# Patient Record
Sex: FEMALE | Race: WHITE | Employment: PART TIME | URBAN - METROPOLITAN AREA
[De-identification: names, ages, dates, MRNs, and addresses within clinical notes are randomized per-mention and may not be internally consistent; named-entity substitution may affect disease eponyms.]

---

## 2020-10-14 ENCOUNTER — APPOINTMENT (OUTPATIENT)
Dept: CT IMAGING | Age: 45
End: 2020-10-14
Payer: COMMERCIAL

## 2020-10-14 ENCOUNTER — HOSPITAL ENCOUNTER (EMERGENCY)
Age: 45
Discharge: HOME | End: 2020-10-14
Attending: EMERGENCY MEDICINE
Payer: COMMERCIAL

## 2020-10-14 VITALS
HEART RATE: 95 BPM | WEIGHT: 260 LBS | DIASTOLIC BLOOD PRESSURE: 100 MMHG | SYSTOLIC BLOOD PRESSURE: 186 MMHG | RESPIRATION RATE: 16 BRPM | OXYGEN SATURATION: 97 % | BODY MASS INDEX: 44.39 KG/M2 | HEIGHT: 64 IN | TEMPERATURE: 98.6 F

## 2020-10-14 LAB
A/G RATIO: 1.8 (ref 1.1–2.2)
ALBUMIN SERPL-MCNC: 4.3 G/DL (ref 3.4–5)
ALP BLD-CCNC: 114 U/L (ref 40–129)
ALT SERPL-CCNC: 26 U/L (ref 10–40)
ANION GAP SERPL CALCULATED.3IONS-SCNC: 12 MMOL/L (ref 3–16)
AST SERPL-CCNC: 19 U/L (ref 15–37)
ATYPICAL LYMPHOCYTE RELATIVE PERCENT: 4 % (ref 0–6)
BACTERIA: ABNORMAL /HPF
BASOPHILS ABSOLUTE: 0 K/UL (ref 0–0.2)
BASOPHILS RELATIVE PERCENT: 0 %
BILIRUB SERPL-MCNC: 0.6 MG/DL (ref 0–1)
BILIRUBIN URINE: NEGATIVE
BLOOD, URINE: ABNORMAL
BUN BLDV-MCNC: 7 MG/DL (ref 7–20)
CALCIUM SERPL-MCNC: 10.6 MG/DL (ref 8.3–10.6)
CHLORIDE BLD-SCNC: 102 MMOL/L (ref 99–110)
CLARITY: CLEAR
CO2: 24 MMOL/L (ref 21–32)
COLOR: YELLOW
CREAT SERPL-MCNC: 0.6 MG/DL (ref 0.6–1.1)
EKG ATRIAL RATE: 93 BPM
EKG DIAGNOSIS: NORMAL
EKG P AXIS: 43 DEGREES
EKG P-R INTERVAL: 146 MS
EKG Q-T INTERVAL: 376 MS
EKG QRS DURATION: 86 MS
EKG QTC CALCULATION (BAZETT): 467 MS
EKG R AXIS: 38 DEGREES
EKG T AXIS: 20 DEGREES
EKG VENTRICULAR RATE: 93 BPM
EOSINOPHILS ABSOLUTE: 0.2 K/UL (ref 0–0.6)
EOSINOPHILS RELATIVE PERCENT: 3 %
EPITHELIAL CELLS, UA: ABNORMAL /HPF (ref 0–5)
GFR AFRICAN AMERICAN: >60
GFR NON-AFRICAN AMERICAN: >60
GLOBULIN: 2.4 G/DL
GLUCOSE BLD-MCNC: 143 MG/DL (ref 70–99)
GLUCOSE URINE: NEGATIVE MG/DL
HCT VFR BLD CALC: 44.5 % (ref 36–48)
HEMOGLOBIN: 15.1 G/DL (ref 12–16)
KETONES, URINE: NEGATIVE MG/DL
LEUKOCYTE ESTERASE, URINE: NEGATIVE
LIPASE: 17 U/L (ref 13–60)
LYMPHOCYTES ABSOLUTE: 1.5 K/UL (ref 1–5.1)
LYMPHOCYTES RELATIVE PERCENT: 14 %
MCH RBC QN AUTO: 27.5 PG (ref 26–34)
MCHC RBC AUTO-ENTMCNC: 33.9 G/DL (ref 31–36)
MCV RBC AUTO: 81.2 FL (ref 80–100)
MICROSCOPIC EXAMINATION: YES
MONOCYTES ABSOLUTE: 0.4 K/UL (ref 0–1.3)
MONOCYTES RELATIVE PERCENT: 5 %
NEUTROPHILS ABSOLUTE: 6.1 K/UL (ref 1.7–7.7)
NEUTROPHILS RELATIVE PERCENT: 74 %
NITRITE, URINE: POSITIVE
PDW BLD-RTO: 14.8 % (ref 12.4–15.4)
PH UA: 7 (ref 5–8)
PLATELET # BLD: 168 K/UL (ref 135–450)
PLATELET SLIDE REVIEW: ADEQUATE
PMV BLD AUTO: 7.9 FL (ref 5–10.5)
POTASSIUM REFLEX MAGNESIUM: 3.6 MMOL/L (ref 3.5–5.1)
PROTEIN UA: NEGATIVE MG/DL
RBC # BLD: 5.49 M/UL (ref 4–5.2)
RBC UA: ABNORMAL /HPF (ref 0–4)
SLIDE REVIEW: ABNORMAL
SODIUM BLD-SCNC: 138 MMOL/L (ref 136–145)
SPECIFIC GRAVITY UA: 1.02 (ref 1–1.03)
TOTAL PROTEIN: 6.7 G/DL (ref 6.4–8.2)
TROPONIN: <0.01 NG/ML
URINE REFLEX TO CULTURE: ABNORMAL
URINE TYPE: ABNORMAL
UROBILINOGEN, URINE: 0.2 E.U./DL
WBC # BLD: 8.3 K/UL (ref 4–11)
WBC UA: ABNORMAL /HPF (ref 0–5)

## 2020-10-14 PROCEDURE — 87088 URINE BACTERIA CULTURE: CPT

## 2020-10-14 PROCEDURE — 6370000000 HC RX 637 (ALT 250 FOR IP): Performed by: EMERGENCY MEDICINE

## 2020-10-14 PROCEDURE — 87077 CULTURE AEROBIC IDENTIFY: CPT

## 2020-10-14 PROCEDURE — 93010 ELECTROCARDIOGRAM REPORT: CPT | Performed by: INTERNAL MEDICINE

## 2020-10-14 PROCEDURE — 2580000003 HC RX 258: Performed by: EMERGENCY MEDICINE

## 2020-10-14 PROCEDURE — 99284 EMERGENCY DEPT VISIT MOD MDM: CPT

## 2020-10-14 PROCEDURE — 93005 ELECTROCARDIOGRAM TRACING: CPT | Performed by: EMERGENCY MEDICINE

## 2020-10-14 PROCEDURE — 6360000002 HC RX W HCPCS: Performed by: EMERGENCY MEDICINE

## 2020-10-14 PROCEDURE — 96374 THER/PROPH/DIAG INJ IV PUSH: CPT

## 2020-10-14 PROCEDURE — 83690 ASSAY OF LIPASE: CPT

## 2020-10-14 PROCEDURE — 74176 CT ABD & PELVIS W/O CONTRAST: CPT

## 2020-10-14 PROCEDURE — 87186 SC STD MICRODIL/AGAR DIL: CPT

## 2020-10-14 PROCEDURE — 87086 URINE CULTURE/COLONY COUNT: CPT

## 2020-10-14 PROCEDURE — 85025 COMPLETE CBC W/AUTO DIFF WBC: CPT

## 2020-10-14 PROCEDURE — 80053 COMPREHEN METABOLIC PANEL: CPT

## 2020-10-14 PROCEDURE — 81001 URINALYSIS AUTO W/SCOPE: CPT

## 2020-10-14 PROCEDURE — 2500000003 HC RX 250 WO HCPCS: Performed by: EMERGENCY MEDICINE

## 2020-10-14 PROCEDURE — 96375 TX/PRO/DX INJ NEW DRUG ADDON: CPT

## 2020-10-14 PROCEDURE — 84484 ASSAY OF TROPONIN QUANT: CPT

## 2020-10-14 RX ORDER — HYDROCHLOROTHIAZIDE 12.5 MG/1
12.5 CAPSULE, GELATIN COATED ORAL DAILY
Qty: 30 CAPSULE | Refills: 1 | Status: SHIPPED | OUTPATIENT
Start: 2020-10-14

## 2020-10-14 RX ORDER — 0.9 % SODIUM CHLORIDE 0.9 %
1000 INTRAVENOUS SOLUTION INTRAVENOUS ONCE
Status: COMPLETED | OUTPATIENT
Start: 2020-10-14 | End: 2020-10-14

## 2020-10-14 RX ORDER — ONDANSETRON 2 MG/ML
4 INJECTION INTRAMUSCULAR; INTRAVENOUS ONCE
Status: COMPLETED | OUTPATIENT
Start: 2020-10-14 | End: 2020-10-14

## 2020-10-14 RX ORDER — ONDANSETRON 4 MG/1
4 TABLET, ORALLY DISINTEGRATING ORAL EVERY 8 HOURS PRN
Qty: 10 TABLET | Refills: 0 | Status: SHIPPED | OUTPATIENT
Start: 2020-10-14 | End: 2020-10-26 | Stop reason: ALTCHOICE

## 2020-10-14 RX ORDER — ACETAMINOPHEN 500 MG
1000 TABLET ORAL ONCE
Status: COMPLETED | OUTPATIENT
Start: 2020-10-14 | End: 2020-10-14

## 2020-10-14 RX ORDER — HYDROCODONE BITARTRATE AND ACETAMINOPHEN 5; 325 MG/1; MG/1
1 TABLET ORAL EVERY 6 HOURS PRN
Qty: 6 TABLET | Refills: 0 | Status: SHIPPED | OUTPATIENT
Start: 2020-10-14 | End: 2020-10-17

## 2020-10-14 RX ORDER — NITROFURANTOIN 25; 75 MG/1; MG/1
100 CAPSULE ORAL 2 TIMES DAILY
Qty: 14 CAPSULE | Refills: 0 | Status: SHIPPED | OUTPATIENT
Start: 2020-10-14 | End: 2020-10-21

## 2020-10-14 RX ORDER — NITROFURANTOIN 25; 75 MG/1; MG/1
100 CAPSULE ORAL ONCE
Status: COMPLETED | OUTPATIENT
Start: 2020-10-14 | End: 2020-10-14

## 2020-10-14 RX ADMIN — SODIUM CHLORIDE 1000 ML: 9 INJECTION, SOLUTION INTRAVENOUS at 04:33

## 2020-10-14 RX ADMIN — FAMOTIDINE 20 MG: 10 INJECTION INTRAVENOUS at 04:33

## 2020-10-14 RX ADMIN — LIDOCAINE HYDROCHLORIDE: 20 SOLUTION ORAL; TOPICAL at 04:32

## 2020-10-14 RX ADMIN — ONDANSETRON HYDROCHLORIDE 4 MG: 2 INJECTION, SOLUTION INTRAMUSCULAR; INTRAVENOUS at 04:33

## 2020-10-14 RX ADMIN — ACETAMINOPHEN 1000 MG: 500 TABLET ORAL at 06:44

## 2020-10-14 RX ADMIN — NITROFURANTOIN (MONOHYDRATE/MACROCRYSTALS) 100 MG: 75; 25 CAPSULE ORAL at 06:44

## 2020-10-14 ASSESSMENT — PAIN SCALES - GENERAL
PAINLEVEL_OUTOF10: 7
PAINLEVEL_OUTOF10: 10

## 2020-10-14 ASSESSMENT — PAIN DESCRIPTION - ORIENTATION: ORIENTATION: MID;UPPER

## 2020-10-14 ASSESSMENT — PAIN DESCRIPTION - LOCATION: LOCATION: ABDOMEN

## 2020-10-14 ASSESSMENT — PAIN DESCRIPTION - DESCRIPTORS: DESCRIPTORS: BURNING;TIGHTNESS

## 2020-10-14 ASSESSMENT — PAIN SCALES - WONG BAKER: WONGBAKER_NUMERICALRESPONSE: 0

## 2020-10-14 ASSESSMENT — PAIN DESCRIPTION - PAIN TYPE: TYPE: ACUTE PAIN

## 2020-10-14 NOTE — ED PROVIDER NOTES
CHIEF COMPLAINT  Abdominal Pain (mid upper abdominal pain x2 months that radiates into her back. Patient states it is worse tonight. Patient also reports having diarrhea x2 months as well. )      HISTORY OF PRESENT ILLNESS  Halley Salazar is a 39 y.o. female presents to the ED with abdominal pain, epigastric and RUQ radiating around back and shoulder blade, worsened tonight, could not sleep, has been off and on for a couple months, pretty much every day, she did eat a fatty meal tonight, but had not noticed which foods bothered her most, nausea and sometimes vomiting, nonbloody/nonbilious, watery diarrhea, usually right after eating, she states she is supposed to be on blood pressure meds, but has not been on her HCTZ, hasn't been to a PCP in over a year or more, no melena/hematochezia, no chest pain/SOB, c section and total hysterectomy but no other abd surgeries. Denies dysuria/hematuria, no vaginal bleeding/discharge. She is a smoker, tried tylenol for pain w/o much improvement, has been taking ibuprofen fairly regularly, which seems to help, tried tums/otc antacids w/o improvement, has had issues w/ reflux before and has medication she could be taking regularly- prevacid, symptoms worse at night, denies any known liver problems/hepatitis, no yellowing of skin/eyes, no fever, denies significant etoh use, denies drug use, No other complaints, modifying factors or associated symptoms. I have reviewed the following from the nursing documentation.     Past Medical History:   Diagnosis Date    Anemia     BORDERLINE--NO MEDS    Anxiety     Fibroid     Hormone disorder     Hypertension     USES HCTZ 12.5    Irregular uterine bleeding     resolved s/p hyst     Past Surgical History:   Procedure Laterality Date     SECTION   & 2003    HYST[facility]CTOMY  2010    [facility]     Family History   Problem Relation Age of Onset    Hypertension Mother     Stroke Mother     Cancer Sister         RECTAL    High Cholesterol Brother      Social History     Socioeconomic History    Marital status:      Spouse name: Not on file    Number of children: Not on file    Years of education: Not on file    Highest education level: Not on file   Occupational History    Not on file   Social Needs    Financial resource strain: Not on file    Food insecurity     Worry: Not on file     Inability: Not on file    Transportation needs     Medical: Not on file     Non-medical: Not on file   Tobacco Use    Smoking status: Current Every Day Smoker     Packs/day: 1.00     Years: 18.00     Pack years: 18.00     Types: Cigarettes   Substance and Sexual Activity    Alcohol use: No    Drug use: No    Sexual activity: Not on file   Lifestyle    Physical activity     Days per week: Not on file     Minutes per session: Not on file    Stress: Not on file   Relationships    Social connections     Talks on phone: Not on file     Gets together: Not on file     Attends Druze service: Not on file     Active member of club or organization: Not on file     Attends meetings of clubs or organizations: Not on file     Relationship status: Not on file    Intimate partner violence     Fear of current or ex partner: Not on file     Emotionally abused: Not on file     Physically abused: Not on file     Forced sexual activity: Not on file   Other Topics Concern    Not on file   Social History Narrative    Not on file     No current facility-administered medications for this encounter. Current Outpatient Medications   Medication Sig Dispense Refill    ondansetron (ZOFRAN ODT) 4 MG disintegrating tablet Take 1 tablet by mouth every 8 hours as needed for Nausea or Vomiting 10 tablet 0    HYDROcodone-acetaminophen (NORCO) 5-325 MG per tablet Take 1 tablet by mouth every 6 hours as needed for Pain for up to 3 days. Intended supply: 3 days.  Take lowest dose possible to manage pain 6 tablet 0    nitrofurantoin, macrocrystal-monohydrate, (MACROBID) 100 MG capsule Take 1 capsule by mouth 2 times daily for 7 days 14 capsule 0    hydroCHLOROthiazide (MICROZIDE) 12.5 MG capsule Take 1 capsule by mouth daily 30 capsule 1    erythromycin (YUNIOR-TAB) 333 MG EC tablet Take 333 mg by mouth 3 times daily.  clonazepam (KLONOPIN) 0.5 MG tablet Take 0.5 mg by mouth nightly as needed. Allergies   Allergen Reactions    Penicillins        REVIEW OF SYSTEMS  10 systems reviewed, pertinent positives per HPI otherwise noted to be negative. PHYSICAL EXAM  BP (!) 186/100   Pulse 95   Temp 98.6 °F (37 °C) (Oral)   Resp 16   Ht 5' 4\" (1.626 m)   Wt 260 lb (117.9 kg)   LMP 09/13/2009   SpO2 97%   BMI 44.63 kg/m²   GENERAL APPEARANCE: Awake and alert. Cooperative. No acute distress, appears older than stated age, obese  HEAD: Normocephalic. Atraumatic. EYES: PERRL. EOM's grossly intact. ENT: Mucous membranes are moist. Airway patent, no stridor/erythema/edema  NECK: Supple. No rigidity  HEART: RRR. No murmurs  LUNGS: Respirations nonlabored. Lungs are CTAB. ABDOMEN: Soft. Non-distended. RUQ/R flank TTP, pos Bro's sign, neg mcburney's pt tenderness, neg rovsing sign, No guarding or rebound. Normal bowel sounds. Rotund, low transverse c section w/ scarring, no erythema/edema/purulence  EXTREMITIES: No peripheral edema. No calf tenderness, Moves all extremities equally. All extremities neurovascularly intact. SKIN: Warm and dry. No acute rashes. NEUROLOGICAL: Alert and oriented. No gross facial drooping. Speech normal  PSYCHIATRIC: Normal mood and affect. LABS  I have reviewed all labs for this visit.    Results for orders placed or performed during the hospital encounter of 10/14/20   CBC Auto Differential   Result Value Ref Range    WBC 8.3 4.0 - 11.0 K/uL    RBC 5.49 (H) 4.00 - 5.20 M/uL    Hemoglobin 15.1 12.0 - 16.0 g/dL    Hematocrit 44.5 36.0 - 48.0 %    MCV 81.2 80.0 - 100.0 fL    MCH 27.5 26.0 - 34.0 pg    MCHC 33.9 31.0 - 36.0 ng/mL   Microscopic Urinalysis   Result Value Ref Range    WBC, UA 3-5 0 - 5 /HPF    RBC, UA 0-2 0 - 4 /HPF    Epithelial Cells, UA 2-5 0 - 5 /HPF    Bacteria, UA 4+ (A) None Seen /HPF   EKG 12 Lead   Result Value Ref Range    Ventricular Rate 93 BPM    Atrial Rate 93 BPM    P-R Interval 146 ms    QRS Duration 86 ms    Q-T Interval 376 ms    QTc Calculation (Bazett) 467 ms    P Axis 43 degrees    R Axis 38 degrees    T Axis 20 degrees    Diagnosis       Normal sinus rhythmNormal ECGNo previous ECGs availableConfirmed by GILLES NAVA MD ([id number]) on 10/14/2020 7:34:20 AM       RADIOLOGY  Ct Abdomen Pelvis Wo Contrast Additional Contrast? None    Result Date: 10/14/2020  EXAMINATION: CT OF THE ABDOMEN AND PELVIS WITHOUT CONTRAST 10/14/2020 4:49 am TECHNIQUE: CT of the abdomen and pelvis was performed without the administration of intravenous contrast. Multiplanar reformatted images are provided for review. Dose modulation, iterative reconstruction, and/or weight based adjustment of the mA/kV was utilized to reduce the radiation dose to as low as reasonably achievable. COMPARISON: None. HISTORY: ORDERING SYSTEM PROVIDED HISTORY: epigastric RUQ R flank pain TECHNOLOGIST PROVIDED HISTORY: Reason for exam:->epigastric RUQ R flank pain Additional Contrast?->None Is the patient pregnant?->No Reason for Exam: epigastric RUQ R flank pain Acuity: Acute Type of Exam: Initial FINDINGS: Lower Chest: The visualized lung bases are clear. Organs: There are no renal or ureteral calculi bilaterally and there is no hydronephrosis. Faint cortical calcification is seen on the right. Cholelithiasis is noted without pericholecystic inflammation. The liver, pancreas, and adrenal glands are grossly negative given the limitations of the noncontrast technique. Splenic size is at the upper limits of normal. GI/Bowel: Small bowel caliber is normal.  The appendix is normal.  The colon is unremarkable. Pelvis:  The patient is status post hysterectomy. The bladder is grossly negative though is nearly completely empty limiting evaluation Peritoneum/Retroperitoneum: Small periaortic and iliac chain lymph nodes are probably reactive. There is no mesenteric stranding or free fluid. Aortic caliber is normal. Bones/Soft Tissues: There is a 2.2 x 4.4 cm focal soft tissue thickening of the inferior aspect of the rectus sheath. There is no acute osseous abnormality. 1. No evidence for renal stone disease. 2. Cholelithiasis without scan evidence for acute cholecystitis. 3. Soft tissue thickening of the inferior rectus sheath is nonspecific. This may represent scar tissue though tumor is not entirely excluded. Comparison with any prior outside imaging would be beneficial.     ED COURSE/MDM  Patient seen and evaluated. Old records reviewed. Labs and imaging reviewed and results discussed with patient.   46yo F w/ abd pain, I am suspicious for gallbladder disease given symptoms, but nml LFTs/lipase, nml WBC, low suspicion for infectious process at this time, she opted for CT scan rather than awaiting US tech to be called in, but did accept outpatient US order and I recommended gen surgery follow up, gallstones noted on imaging, told to follow low fat diet, she was also noted to have a UTI incidentally, started macrobid, no current suspicion for sepsis/pyelo, given age and risk factors checked trop and EKG but both were normal and she denied chest pain, discussed her risk factors for heart disease/stroke, encouraged smoking cessation, resumed her HCTZ and given PCP follow up, explained she may need med increased or additional meds for HTN, he reports it has been this high for a while, given meds for symptom management for home, GI cocktail and pepcid didn't help pain much, but zofran improved nausea, encouraged resuming her daily PPI for reflux, especially if she will be taking NSAIDs, we also discussed the incidental finding of likely scar tissue from prior surgery, she was aware, Strict return precautions given, all questions answered, will return if any worsening symptoms or new concerns, see AVS for further discharge information, patient verbalized understanding of plan, felt comfortable going home. I estimate there is LOW risk for ACUTE APPENDICITIS, BOWEL OBSTRUCTION, ACUTE CHOLECYSTITIS/ASCENDING CHOLANGITIS, DIVERTICULITIS, INCARCERATED HERNIA, PANCREATITIS, or PERFORATED BOWEL or ULCER, thus I consider the discharge disposition reasonable. Also, there is no evidence or peritonitis, sepsis, or toxicity. Birdie Juarez and I have discussed the diagnosis and risks, and we agree with discharging home to follow-up with their primary doctor. We also discussed returning to the Emergency Department immediately if new or worsening symptoms occur. We have discussed the symptoms which are most concerning (e.g., bloody stool, fever, changing or worsening pain, vomiting) that necessitate immediate return.    Orders Placed This Encounter   Procedures    Culture, Urine    CT ABDOMEN PELVIS WO CONTRAST Additional Contrast? None    US GALLBLADDER RUQ    CBC Auto Differential    Comprehensive Metabolic Panel w/ Reflex to MG    Lipase    Urinalysis Reflex to Culture    Troponin    Microscopic Urinalysis    Referral for No Primary Care Physician - Urgent    EKG 12 Lead    Insert peripheral IV     Orders Placed This Encounter   Medications    0.9 % sodium chloride bolus    aluminum & magnesium hydroxide-simethicone (MAALOX) 30 mL, lidocaine viscous hcl (XYLOCAINE) 5 mL (GI COCKTAIL)    ondansetron (ZOFRAN) injection 4 mg    famotidine (PEPCID) injection 20 mg    nitrofurantoin (macrocrystal-monohydrate) (MACROBID) capsule 100 mg    acetaminophen (TYLENOL) tablet 1,000 mg    ondansetron (ZOFRAN ODT) 4 MG disintegrating tablet     Sig: Take 1 tablet by mouth every 8 hours as needed for Nausea or Vomiting     Dispense:  10 tablet     Refill:  0    HYDROcodone-acetaminophen (NORCO) 5-325 MG per tablet     Sig: Take 1 tablet by mouth every 6 hours as needed for Pain for up to 3 days. Intended supply: 3 days. Take lowest dose possible to manage pain     Dispense:  6 tablet     Refill:  0    nitrofurantoin, macrocrystal-monohydrate, (MACROBID) 100 MG capsule     Sig: Take 1 capsule by mouth 2 times daily for 7 days     Dispense:  14 capsule     Refill:  0    hydroCHLOROthiazide (MICROZIDE) 12.5 MG capsule     Sig: Take 1 capsule by mouth daily     Dispense:  30 capsule     Refill:  1     ED Course as of Oct 14 0811   Wed Oct 14, 2020   3810 EKG interpretation by me: Rhythm, rate 93, QTc 467, normal axis, no ST segment or T wave changes indicative of acute ischemia   EKG 12 Lead [SY]      ED Course User Index  [SY] Jorge Carmona DO       CLINICAL IMPRESSION  1. Uncontrolled hypertension    2. Abdominal pain, epigastric    3. Abdominal pain, right upper quadrant    4. Gallstones    5. Nausea    6. Diarrhea, unspecified type    7. Gastroesophageal reflux disease, unspecified whether esophagitis present    8. Smoker        Blood pressure (!) 186/100, pulse 95, temperature 98.6 °F (37 °C), temperature source Oral, resp. rate 16, height 5' 4\" (1.626 m), weight 260 lb (117.9 kg), last menstrual period 09/13/2009, SpO2 97 %. DISPOSITION  Jo Bernheim was discharged to home in stable condition.                    Jorge Carmona DO  10/20/20 1050

## 2020-10-14 NOTE — DISCHARGE INSTR - COC
Continuity of Care Form    Patient Name: Taqueria Hanks   :  1975  MRN:  [id number]    Admit date:  10/14/2020  Discharge date:  ***    Code Status Order: No Order   Advance Directives:     Admitting Physician:  No admitting provider for patient encounter. PCP: Ambar Vlilavicencio (Inactive)    Discharging Nurse: [facility] Unit/Room#:   Discharging Unit Phone Number: ***    Emergency Contact:   Extended Emergency Contact Information  Primary Emergency Contact: Jon Juarez  Address: [facility] [address] [facility] [address], [address] Phone: [phone]  Work Phone: [phone]  Relation: Spouse    Past Surgical History:  Past Surgical History:   Procedure Laterality Date     SECTION   &     HYST[facility]Y  2010    [facility]       Immunization History: There is no immunization history on file for this patient.     Active Problems:  Patient Active Problem List   Diagnosis Code    Anemia D64.9    Anxiety F41.9    Hypertension I10    Bartholin's Gland Abscess--right N75.1       Isolation/Infection:   Isolation          No Isolation        Patient Infection Status     None to display          Nurse Assessment:  Last Vital Signs: BP (!) 172/86   Pulse 95   Temp 98.6 °F (37 °C) (Oral)   Resp 16   Ht 5' 4\" (1.626 m)   Wt 260 lb (117.9 kg)   LMP 2009   SpO2 100%   BMI 44.63 kg/m²     Last documented pain score (0-10 scale): Pain Level: 10  Last Weight:   Wt Readings from Last 1 Encounters:   10/14/20 260 lb (117.9 kg)     Mental Status:  {IP PT MENTAL STATUS:}    IV Access:  { MINA IV ACCESS:224957827}    Nursing Mobility/ADLs:  Walking   {CHP DME MFVP:114166476}  Transfer  {CHP DME EYMJ:866990546}  Bathing  {CHP DME XRG}  Dressing  {CHP DME PCTW:329753295}  Toileting  {CHP DME MJFB:507080228}  Feeding  {CHP DME BUBB:513032326}  Med Admin  {P DME WSQA:166046034}  Med Delivery   { MINA MED Delivery:407753548}    Wound Care Documentation and Therapy:        Elimination:  Continence:   · Bowel: {YES / JL:62126}  · Bladder: {YES / ZP:11057}  Urinary Catheter: {Urinary Catheter:968658251}   Colostomy/Ileostomy/Ileal Conduit: {YES / RO:34592}       Date of Last BM: ***  No intake or output data in the 24 hours ending 10/14/20 0605  No intake/output data recorded.     Safety Concerns:     [address] [facility] Safety Concerns:587131361}    Impairments/Disabilities:      [address] [facility] Impairments/Disabilities:890857657}    Nutrition Therapy:  Current Nutrition Therapy:   [address] [facility] Diet List:442903457}    Routes of Feeding: {CHP DME Other Feedings:567837675}  Liquids: {Slp liquid thickness:69906}  Daily Fluid Restriction: {CHP DME Yes amt example:335959446}  Last Modified Barium Swallow with Video (Video Swallowing Test): {Done Not Done AUKH:997291558}    Treatments at the Time of Hospital Discharge:   Respiratory Treatments: ***  Oxygen Therapy:  {Therapy; copd oxygen:80284}  Ventilator:    { CC Vent TZYD:384381492}    Rehab Therapies: {THERAPEUTIC INTERVENTION:4724503668}  Weight Bearing Status/Restrictions: [address]  Weight Bearin}  Other Medical Equipment (for information only, NOT a DME order):  {EQUIPMENT:791199474}  Other Treatments: ***    Patient's personal belongings (please select all that are sent with patient):  {[facility] DME Belongings:279916883}    RN SIGNATURE:  {Esignature:912339349}    CASE MANAGEMENT/SOCIAL WORK SECTION    Inpatient Status Date: ***    Readmission Risk Assessment Score:  Readmission Risk              Risk of Unplanned Readmission:        0           Discharging to Facility/ Agency   · Name:   · Address:  · Phone:  · Fax:    Dialysis Facility (if applicable)   · Name:  · Address:  · Dialysis Schedule:  · Phone:  · Fax:    / signature: {Esignature:914059037}    PHYSICIAN SECTION    Prognosis: {Prognosis:4326421718}    Condition at Discharge: [address] Patient Condition:755375435}    Rehab Potential (if transferring to Rehab): {Prognosis:0066695473}    Recommended Labs or Other Treatments After Discharge: ***    Physician Certification: I certify the above information and transfer of Vinay Frank  is necessary for the continuing treatment of the diagnosis listed and that she requires {Admit to Appropriate Level of Care:31479} for {GREATER/LESS:213411755} 30 days.      Update Admission H&P: {CHP DME Changes in [facility]:343172211}    PHYSICIAN SIGNATURE:  {Esignature:437865606}

## 2020-10-16 ENCOUNTER — HOSPITAL ENCOUNTER (OUTPATIENT)
Dept: ULTRASOUND IMAGING | Age: 45
Discharge: HOME | End: 2020-10-16
Payer: COMMERCIAL

## 2020-10-16 LAB
ORGANISM: ABNORMAL
URINE CULTURE, ROUTINE: ABNORMAL

## 2020-10-16 PROCEDURE — 76705 ECHO EXAM OF ABDOMEN: CPT

## 2020-10-16 NOTE — RESULT ENCOUNTER NOTE
Culture reviewed, culture is positive, patient was discharged on an appropriate antibiotic. Please inform patient of positive culture but the correct antibiotic and to have repeat urinalysis in 1 week with PCP to ensure UTI resolves since she did complain of back pain and sometimes this will not treat the infection if it is related to pyelonephritis, but based on the limited note I currently have, it sounds that the back pain is more of a chronic problem and not related to the UTI.

## 2020-10-20 ENCOUNTER — INITIAL CONSULT (OUTPATIENT)
Dept: SURGERY | Age: 45
End: 2020-10-20
Payer: COMMERCIAL

## 2020-10-20 VITALS
HEIGHT: 64 IN | WEIGHT: 280 LBS | BODY MASS INDEX: 47.8 KG/M2 | SYSTOLIC BLOOD PRESSURE: 128 MMHG | TEMPERATURE: 97.8 F | DIASTOLIC BLOOD PRESSURE: 78 MMHG

## 2020-10-20 PROCEDURE — 99243 OFF/OP CNSLTJ NEW/EST LOW 30: CPT | Performed by: SURGERY

## 2020-10-20 RX ORDER — HEPARIN SODIUM 5000 [USP'U]/ML
5000 INJECTION, SOLUTION INTRAVENOUS; SUBCUTANEOUS ONCE
Status: CANCELLED | OUTPATIENT
Start: 2020-10-20

## 2020-10-20 RX ORDER — INDOCYANINE GREEN AND WATER 25 MG
5 KIT INJECTION ONCE
Status: CANCELLED | OUTPATIENT
Start: 2020-10-20 | End: 2020-10-20

## 2020-10-20 RX ORDER — SODIUM CHLORIDE 0.9 % (FLUSH) 0.9 %
10 SYRINGE (ML) INJECTION EVERY 12 HOURS SCHEDULED
Status: CANCELLED | OUTPATIENT
Start: 2020-10-20

## 2020-10-20 RX ORDER — SODIUM CHLORIDE 0.9 % (FLUSH) 0.9 %
10 SYRINGE (ML) INJECTION PRN
Status: CANCELLED | OUTPATIENT
Start: 2020-10-20

## 2020-10-20 NOTE — PROGRESS NOTES
New Patient Via Jose Rafael Pena MD    [address], [address] [facility]  [facility]  [phone]    [facility]   YOB: 1975    Date of Visit:  10/20/2020    [facility]      Chief Complaint: Abdominal pain    HPI: Patient presents for evaluation of abdominal pain. She has had multiple episodes of epigastric and right upper quadrant pain that radiates around her back. She has had associated nausea and occasional vomiting. She denies fever or chills. The pain is a crampy pain that she states feels similar to labor.   She has been watching the fat in her diet recently, and this seems to improve her pain    Allergies   Allergen Reactions    Penicillins      Outpatient Medications Marked as Taking for the 10/20/20 encounter (Initial consult) with Girma Bell MD   Medication Sig Dispense Refill    nitrofurantoin, macrocrystal-monohydrate, (MACROBID) 100 MG capsule Take 1 capsule by mouth 2 times daily for 7 days 14 capsule 0    hydroCHLOROthiazide (MICROZIDE) 12.5 MG capsule Take 1 capsule by mouth daily 30 capsule 1       Past Medical History:   Diagnosis Date    Anemia     BORDERLINE--NO MEDS    Anxiety     Fibroid     Hormone disorder     Hypertension     USES HCTZ 12.5    Irregular uterine bleeding     resolved s/p hyst     Past Surgical History:   Procedure Laterality Date     SECTION   &     HY[facility]Y  2010    [facility]     Family History   Problem Relation Age of Onset    Hypertension Mother     Stroke Mother     Cancer Sister         RECTAL    High Cholesterol Brother      Social History     Socioeconomic History    Marital status:      Spouse name: Not on file    Number of children: Not on file    Years of education: Not on file    Highest education level: Not on file   Occupational History    Not on file   Social Needs    Financial resource strain: Not on file   [place] insecurity     Worry: Not on file     Inability: Not on file    Transportation needs     Medical: Not on file     Non-medical: Not on file   Tobacco Use    Smoking status: Current Every Day Smoker     Packs/day: 1.00     Years: 18.00     Pack years: 18.00     Types: Cigarettes    Smokeless tobacco: Never Used   Substance and Sexual Activity    Alcohol use: No    Drug use: No    Sexual activity: Not on file   Lifestyle    Physical activity     Days per week: Not on file     Minutes per session: Not on file    Stress: Not on file   Relationships    Social connections     Talks on phone: Not on file     Gets together: Not on file     Attends Gnosticism service: Not on file     Active member of club or organization: Not on file     Attends meetings of clubs or organizations: Not on file     Relationship status: Not on file    Intimate partner violence     Fear of current or ex partner: Not on file     Emotionally abused: Not on file     Physically abused: Not on file     Forced sexual activity: Not on file   Other Topics Concern    Not on file   Social History Narrative    Not on file          Vitals:    10/20/20 0939   BP: 128/78   Site: Left Upper Arm   Position: Sitting   Cuff Size: Large Adult   Temp: 97.8 °F (36.6 °C)   TempSrc: Temporal   Weight: 280 lb (127 kg)   Height: 5' 4\" (1.626 m)     Body mass index is 48.06 kg/m².      Wt Readings from Last 3 Encounters:   10/20/20 280 lb (127 kg)   10/14/20 260 lb (117.9 kg)   03/10/10 263 lb (119.3 kg)     BP Readings from Last 3 Encounters:   10/20/20 128/78   10/14/20 (!) 186/100   03/10/10 132/84        ROS:  As per HPI, otherwise reviewed ×10 systems and negative    PE:  Constitutional:  Well developed, well nourished, no acute distress, non-toxic appearance   Eyes:  PERRL, conjunctiva normal   HENT:  Atraumatic, external ears normal, nose normal. Neck- normal range of motion, no tenderness, supple   Respiratory:  No respiratory distress, normal breath sounds, no rales, no wheezing   Cardiovascular:  Normal rate, normal rhythm  GI: Bowel sounds positive, soft, nontender  :  No costovertebral angle tenderness   Integument:  Well hydrated, no rash   Lymphatic:  No lymphadenopathy noted   Neurologic:  Alert & oriented x 3, no focal deficits noted   Psychiatric:  Speech and behavior appropriate       DATA:  CBC with Differential:    Lab Results   Component Value Date    WBC 8.3 10/14/2020    RBC 5.49 10/14/2020    HGB 15.1 10/14/2020    HCT 44.5 10/14/2020     10/14/2020    MCV 81.2 10/14/2020    MCH 27.5 10/14/2020    MCHC 33.9 10/14/2020    RDW 14.8 10/14/2020    SEGSPCT 77.7 01/26/2010    LYMPHOPCT 14.0 10/14/2020    MONOPCT 5.0 10/14/2020    EOSPCT 0.2 01/26/2010    BASOPCT 0.0 10/14/2020    MONOSABS 0.4 10/14/2020    LYMPHSABS 1.5 10/14/2020    EOSABS 0.2 10/14/2020    BASOSABS 0.0 10/14/2020    DIFFTYPE Auto 01/26/2010     CMP:    Lab Results   Component Value Date     10/14/2020    K 3.6 10/14/2020     10/14/2020    CO2 24 10/14/2020    BUN 7 10/14/2020    CREATININE 0.6 10/14/2020    GFRAA >60 10/14/2020    AGRATIO 1.8 10/14/2020    LABGLOM >60 10/14/2020    GLUCOSE 143 10/14/2020    PROT 6.7 10/14/2020    LABALBU 4.3 10/14/2020    CALCIUM 10.6 10/14/2020    BILITOT 0.6 10/14/2020    ALKPHOS 114 10/14/2020    AST 19 10/14/2020    ALT 26 10/14/2020     Radiology Review: CT and ultrasound images reviewed and show cholelithiasis without secondary signs of cholecystitis      Assessment:  1. Gallstones        Plan: Robotic cholecystectomy. I explained the procedure including risks, benefits, and alternatives. Questions were answered and the patient agrees to proceed.

## 2020-10-26 ENCOUNTER — HOSPITAL ENCOUNTER (OUTPATIENT)
Age: 45
Discharge: HOME | End: 2020-10-26
Payer: COMMERCIAL

## 2020-10-26 PROCEDURE — U0003 INFECTIOUS AGENT DETECTION BY NUCLEIC ACID (DNA OR RNA); SEVERE ACUTE RESPIRATORY SYNDROME CORONAVIRUS 2 (SARS-COV-2) (CORONAVIRUS DISEASE [COVID-19]), AMPLIFIED PROBE TECHNIQUE, MAKING USE OF HIGH THROUGHPUT TECHNOLOGIES AS DESCRIBED BY CMS-2020-01-R: HCPCS

## 2020-10-27 ENCOUNTER — ANESTHESIA EVENT (OUTPATIENT)
Dept: OPERATING ROOM | Age: 45
End: 2020-10-27
Payer: COMMERCIAL

## 2020-10-27 LAB — SARS-COV-2, NAA: NOT DETECTED

## 2020-10-30 ENCOUNTER — ANESTHESIA (OUTPATIENT)
Dept: OPERATING ROOM | Age: 45
End: 2020-10-30
Payer: COMMERCIAL

## 2020-10-30 ENCOUNTER — HOSPITAL ENCOUNTER (OUTPATIENT)
Age: 45
Setting detail: OUTPATIENT SURGERY
Discharge: HOME | End: 2020-10-30
Attending: SURGERY | Admitting: SURGERY
Payer: COMMERCIAL

## 2020-10-30 VITALS — DIASTOLIC BLOOD PRESSURE: 65 MMHG | OXYGEN SATURATION: 96 % | SYSTOLIC BLOOD PRESSURE: 132 MMHG

## 2020-10-30 VITALS
SYSTOLIC BLOOD PRESSURE: 137 MMHG | RESPIRATION RATE: 18 BRPM | OXYGEN SATURATION: 97 % | HEIGHT: 64 IN | TEMPERATURE: 97 F | HEART RATE: 87 BPM | WEIGHT: 280 LBS | BODY MASS INDEX: 47.8 KG/M2 | DIASTOLIC BLOOD PRESSURE: 84 MMHG

## 2020-10-30 PROCEDURE — 7100000001 HC PACU RECOVERY - ADDTL 15 MIN: Performed by: SURGERY

## 2020-10-30 PROCEDURE — 7100000000 HC PACU RECOVERY - FIRST 15 MIN: Performed by: SURGERY

## 2020-10-30 PROCEDURE — 2580000003 HC RX 258: Performed by: ANESTHESIOLOGY

## 2020-10-30 PROCEDURE — 6360000002 HC RX W HCPCS: Performed by: SURGERY

## 2020-10-30 PROCEDURE — 7100000010 HC PHASE II RECOVERY - FIRST 15 MIN: Performed by: SURGERY

## 2020-10-30 PROCEDURE — 6360000002 HC RX W HCPCS: Performed by: ANESTHESIOLOGY

## 2020-10-30 PROCEDURE — 3600000009 HC SURGERY ROBOT BASE: Performed by: SURGERY

## 2020-10-30 PROCEDURE — 7100000011 HC PHASE II RECOVERY - ADDTL 15 MIN: Performed by: SURGERY

## 2020-10-30 PROCEDURE — 2500000003 HC RX 250 WO HCPCS: Performed by: SURGERY

## 2020-10-30 PROCEDURE — 2709999900 HC NON-CHARGEABLE SUPPLY: Performed by: SURGERY

## 2020-10-30 PROCEDURE — 2500000003 HC RX 250 WO HCPCS: Performed by: NURSE ANESTHETIST, CERTIFIED REGISTERED

## 2020-10-30 PROCEDURE — 3600000019 HC SURGERY ROBOT ADDTL 15MIN: Performed by: SURGERY

## 2020-10-30 PROCEDURE — 2500000003 HC RX 250 WO HCPCS: Performed by: ANESTHESIOLOGY

## 2020-10-30 PROCEDURE — 47562 LAPAROSCOPIC CHOLECYSTECTOMY: CPT | Performed by: SURGERY

## 2020-10-30 PROCEDURE — S2900 ROBOTIC SURGICAL SYSTEM: HCPCS | Performed by: SURGERY

## 2020-10-30 PROCEDURE — 3700000000 HC ANESTHESIA ATTENDED CARE: Performed by: SURGERY

## 2020-10-30 PROCEDURE — 6360000002 HC RX W HCPCS: Performed by: NURSE ANESTHETIST, CERTIFIED REGISTERED

## 2020-10-30 PROCEDURE — 3700000001 HC ADD 15 MINUTES (ANESTHESIA): Performed by: SURGERY

## 2020-10-30 PROCEDURE — 2580000003 HC RX 258: Performed by: SURGERY

## 2020-10-30 PROCEDURE — 88304 TISSUE EXAM BY PATHOLOGIST: CPT

## 2020-10-30 RX ORDER — DEXAMETHASONE SODIUM PHOSPHATE 4 MG/ML
INJECTION, SOLUTION INTRA-ARTICULAR; INTRALESIONAL; INTRAMUSCULAR; INTRAVENOUS; SOFT TISSUE PRN
Status: DISCONTINUED | OUTPATIENT
Start: 2020-10-30 | End: 2020-10-30 | Stop reason: SDUPTHER

## 2020-10-30 RX ORDER — OXYCODONE HYDROCHLORIDE AND ACETAMINOPHEN 5; 325 MG/1; MG/1
2 TABLET ORAL PRN
Status: DISCONTINUED | OUTPATIENT
Start: 2020-10-30 | End: 2020-10-30 | Stop reason: HOSPADM

## 2020-10-30 RX ORDER — MEPERIDINE HYDROCHLORIDE 25 MG/ML
12.5 INJECTION INTRAMUSCULAR; INTRAVENOUS; SUBCUTANEOUS EVERY 5 MIN PRN
Status: DISCONTINUED | OUTPATIENT
Start: 2020-10-30 | End: 2020-10-30 | Stop reason: HOSPADM

## 2020-10-30 RX ORDER — LIDOCAINE HYDROCHLORIDE 20 MG/ML
INJECTION, SOLUTION EPIDURAL; INFILTRATION; INTRACAUDAL; PERINEURAL PRN
Status: DISCONTINUED | OUTPATIENT
Start: 2020-10-30 | End: 2020-10-30 | Stop reason: SDUPTHER

## 2020-10-30 RX ORDER — FENTANYL CITRATE 50 UG/ML
INJECTION, SOLUTION INTRAMUSCULAR; INTRAVENOUS PRN
Status: DISCONTINUED | OUTPATIENT
Start: 2020-10-30 | End: 2020-10-30 | Stop reason: SDUPTHER

## 2020-10-30 RX ORDER — BUPIVACAINE HYDROCHLORIDE 5 MG/ML
INJECTION, SOLUTION EPIDURAL; INTRACAUDAL PRN
Status: DISCONTINUED | OUTPATIENT
Start: 2020-10-30 | End: 2020-10-30 | Stop reason: ALTCHOICE

## 2020-10-30 RX ORDER — IBUPROFEN 800 MG/1
800 TABLET ORAL 3 TIMES DAILY PRN
Qty: 30 TABLET | Refills: 0 | Status: SHIPPED | OUTPATIENT
Start: 2020-10-30 | End: 2021-03-10

## 2020-10-30 RX ORDER — ONDANSETRON 2 MG/ML
4 INJECTION INTRAMUSCULAR; INTRAVENOUS EVERY 10 MIN PRN
Status: DISCONTINUED | OUTPATIENT
Start: 2020-10-30 | End: 2020-10-30 | Stop reason: HOSPADM

## 2020-10-30 RX ORDER — HYDRALAZINE HYDROCHLORIDE 20 MG/ML
5 INJECTION INTRAMUSCULAR; INTRAVENOUS EVERY 10 MIN PRN
Status: DISCONTINUED | OUTPATIENT
Start: 2020-10-30 | End: 2020-10-30 | Stop reason: HOSPADM

## 2020-10-30 RX ORDER — SODIUM CHLORIDE, SODIUM LACTATE, POTASSIUM CHLORIDE, AND CALCIUM CHLORIDE .6; .31; .03; .02 G/100ML; G/100ML; G/100ML; G/100ML
IRRIGANT IRRIGATION PRN
Status: DISCONTINUED | OUTPATIENT
Start: 2020-10-30 | End: 2020-10-30 | Stop reason: ALTCHOICE

## 2020-10-30 RX ORDER — ONDANSETRON 2 MG/ML
INJECTION INTRAMUSCULAR; INTRAVENOUS PRN
Status: DISCONTINUED | OUTPATIENT
Start: 2020-10-30 | End: 2020-10-30 | Stop reason: SDUPTHER

## 2020-10-30 RX ORDER — SODIUM CHLORIDE, SODIUM LACTATE, POTASSIUM CHLORIDE, CALCIUM CHLORIDE 600; 310; 30; 20 MG/100ML; MG/100ML; MG/100ML; MG/100ML
INJECTION, SOLUTION INTRAVENOUS CONTINUOUS
Status: DISCONTINUED | OUTPATIENT
Start: 2020-10-30 | End: 2020-10-30 | Stop reason: HOSPADM

## 2020-10-30 RX ORDER — INDOCYANINE GREEN AND WATER 25 MG
5 KIT INJECTION ONCE
Status: COMPLETED | OUTPATIENT
Start: 2020-10-30 | End: 2020-10-30

## 2020-10-30 RX ORDER — OXYCODONE HYDROCHLORIDE AND ACETAMINOPHEN 5; 325 MG/1; MG/1
1 TABLET ORAL EVERY 6 HOURS PRN
Qty: 12 TABLET | Refills: 0 | Status: SHIPPED | OUTPATIENT
Start: 2020-10-30 | End: 2020-11-02

## 2020-10-30 RX ORDER — ROCURONIUM BROMIDE 10 MG/ML
INJECTION, SOLUTION INTRAVENOUS PRN
Status: DISCONTINUED | OUTPATIENT
Start: 2020-10-30 | End: 2020-10-30 | Stop reason: SDUPTHER

## 2020-10-30 RX ORDER — SODIUM CHLORIDE 0.9 % (FLUSH) 0.9 %
10 SYRINGE (ML) INJECTION EVERY 12 HOURS SCHEDULED
Status: DISCONTINUED | OUTPATIENT
Start: 2020-10-30 | End: 2020-10-30 | Stop reason: HOSPADM

## 2020-10-30 RX ORDER — SODIUM CHLORIDE 0.9 % (FLUSH) 0.9 %
10 SYRINGE (ML) INJECTION PRN
Status: DISCONTINUED | OUTPATIENT
Start: 2020-10-30 | End: 2020-10-30 | Stop reason: HOSPADM

## 2020-10-30 RX ORDER — HYDRALAZINE HYDROCHLORIDE 20 MG/ML
INJECTION INTRAMUSCULAR; INTRAVENOUS PRN
Status: DISCONTINUED | OUTPATIENT
Start: 2020-10-30 | End: 2020-10-30 | Stop reason: SDUPTHER

## 2020-10-30 RX ORDER — LABETALOL HYDROCHLORIDE 5 MG/ML
5 INJECTION, SOLUTION INTRAVENOUS EVERY 10 MIN PRN
Status: DISCONTINUED | OUTPATIENT
Start: 2020-10-30 | End: 2020-10-30 | Stop reason: HOSPADM

## 2020-10-30 RX ORDER — HEPARIN SODIUM 5000 [USP'U]/ML
5000 INJECTION, SOLUTION INTRAVENOUS; SUBCUTANEOUS ONCE
Status: COMPLETED | OUTPATIENT
Start: 2020-10-30 | End: 2020-10-30

## 2020-10-30 RX ORDER — OXYCODONE HYDROCHLORIDE AND ACETAMINOPHEN 5; 325 MG/1; MG/1
1 TABLET ORAL PRN
Status: DISCONTINUED | OUTPATIENT
Start: 2020-10-30 | End: 2020-10-30 | Stop reason: HOSPADM

## 2020-10-30 RX ORDER — OXYCODONE HYDROCHLORIDE AND ACETAMINOPHEN 5; 325 MG/1; MG/1
1 TABLET ORAL EVERY 6 HOURS PRN
Qty: 20 TABLET | Refills: 0 | Status: SHIPPED | OUTPATIENT
Start: 2020-10-30 | End: 2020-10-30 | Stop reason: SDUPTHER

## 2020-10-30 RX ORDER — PROPOFOL 10 MG/ML
INJECTION, EMULSION INTRAVENOUS PRN
Status: DISCONTINUED | OUTPATIENT
Start: 2020-10-30 | End: 2020-10-30 | Stop reason: SDUPTHER

## 2020-10-30 RX ADMIN — INDOCYANINE GREEN AND WATER 5 MG: KIT at 07:10

## 2020-10-30 RX ADMIN — ROCURONIUM BROMIDE 70 MG: 10 INJECTION, SOLUTION INTRAVENOUS at 08:32

## 2020-10-30 RX ADMIN — ONDANSETRON 4 MG: 2 INJECTION, SOLUTION INTRAMUSCULAR; INTRAVENOUS at 08:42

## 2020-10-30 RX ADMIN — SODIUM CHLORIDE, POTASSIUM CHLORIDE, SODIUM LACTATE AND CALCIUM CHLORIDE: 600; 310; 30; 20 INJECTION, SOLUTION INTRAVENOUS at 09:16

## 2020-10-30 RX ADMIN — SUGAMMADEX 200 MG: 100 INJECTION, SOLUTION INTRAVENOUS at 09:24

## 2020-10-30 RX ADMIN — SODIUM CHLORIDE, POTASSIUM CHLORIDE, SODIUM LACTATE AND CALCIUM CHLORIDE: 600; 310; 30; 20 INJECTION, SOLUTION INTRAVENOUS at 07:09

## 2020-10-30 RX ADMIN — FENTANYL CITRATE 25 MCG: 50 INJECTION INTRAMUSCULAR; INTRAVENOUS at 09:32

## 2020-10-30 RX ADMIN — FAMOTIDINE 20 MG: 10 INJECTION INTRAVENOUS at 07:10

## 2020-10-30 RX ADMIN — DEXAMETHASONE SODIUM PHOSPHATE 10 MG: 4 INJECTION, SOLUTION INTRAMUSCULAR; INTRAVENOUS at 08:42

## 2020-10-30 RX ADMIN — HYDROMORPHONE HYDROCHLORIDE 0.5 MG: 1 INJECTION, SOLUTION INTRAMUSCULAR; INTRAVENOUS; SUBCUTANEOUS at 10:20

## 2020-10-30 RX ADMIN — HEPARIN SODIUM 5000 UNITS: 5000 INJECTION INTRAVENOUS; SUBCUTANEOUS at 07:10

## 2020-10-30 RX ADMIN — FENTANYL CITRATE 100 MCG: 50 INJECTION INTRAMUSCULAR; INTRAVENOUS at 08:32

## 2020-10-30 RX ADMIN — FENTANYL CITRATE 50 MCG: 50 INJECTION INTRAMUSCULAR; INTRAVENOUS at 09:28

## 2020-10-30 RX ADMIN — FENTANYL CITRATE 25 MCG: 50 INJECTION INTRAMUSCULAR; INTRAVENOUS at 09:30

## 2020-10-30 RX ADMIN — LIDOCAINE HYDROCHLORIDE 50 MG: 20 INJECTION, SOLUTION EPIDURAL; INFILTRATION; INTRACAUDAL; PERINEURAL at 08:32

## 2020-10-30 RX ADMIN — HYDRALAZINE HYDROCHLORIDE 5 MG: 20 INJECTION INTRAMUSCULAR; INTRAVENOUS at 08:56

## 2020-10-30 RX ADMIN — PROPOFOL 200 MG: 10 INJECTION, EMULSION INTRAVENOUS at 08:32

## 2020-10-30 ASSESSMENT — PULMONARY FUNCTION TESTS
PIF_VALUE: 37
PIF_VALUE: 2
PIF_VALUE: 41
PIF_VALUE: 37
PIF_VALUE: 34
PIF_VALUE: 43
PIF_VALUE: 37
PIF_VALUE: 41
PIF_VALUE: 36
PIF_VALUE: 39
PIF_VALUE: 1
PIF_VALUE: 39
PIF_VALUE: 36
PIF_VALUE: 37
PIF_VALUE: 42
PIF_VALUE: 2
PIF_VALUE: 27
PIF_VALUE: 44
PIF_VALUE: 23
PIF_VALUE: 37
PIF_VALUE: 1
PIF_VALUE: 21
PIF_VALUE: 38
PIF_VALUE: 33
PIF_VALUE: 37
PIF_VALUE: 1
PIF_VALUE: 37
PIF_VALUE: 40
PIF_VALUE: 25
PIF_VALUE: 40
PIF_VALUE: 36
PIF_VALUE: 42
PIF_VALUE: 40
PIF_VALUE: 34
PIF_VALUE: 38
PIF_VALUE: 2
PIF_VALUE: 2
PIF_VALUE: 38
PIF_VALUE: 2
PIF_VALUE: 34
PIF_VALUE: 24
PIF_VALUE: 36
PIF_VALUE: 39
PIF_VALUE: 39
PIF_VALUE: 41
PIF_VALUE: 35
PIF_VALUE: 38
PIF_VALUE: 35
PIF_VALUE: 43
PIF_VALUE: 34
PIF_VALUE: 37
PIF_VALUE: 40
PIF_VALUE: 30
PIF_VALUE: 42
PIF_VALUE: 5
PIF_VALUE: 2
PIF_VALUE: 39
PIF_VALUE: 40
PIF_VALUE: 29
PIF_VALUE: 39
PIF_VALUE: 40
PIF_VALUE: 41
PIF_VALUE: 2
PIF_VALUE: 35
PIF_VALUE: 38
PIF_VALUE: 35
PIF_VALUE: 1
PIF_VALUE: 2
PIF_VALUE: 1
PIF_VALUE: 27
PIF_VALUE: 41
PIF_VALUE: 2
PIF_VALUE: 2
PIF_VALUE: 42
PIF_VALUE: 36

## 2020-10-30 ASSESSMENT — PAIN SCALES - GENERAL
PAINLEVEL_OUTOF10: 2
PAINLEVEL_OUTOF10: 0
PAINLEVEL_OUTOF10: 1
PAINLEVEL_OUTOF10: 2
PAINLEVEL_OUTOF10: 10
PAINLEVEL_OUTOF10: 4
PAINLEVEL_OUTOF10: 10

## 2020-10-30 ASSESSMENT — PAIN - FUNCTIONAL ASSESSMENT: PAIN_FUNCTIONAL_ASSESSMENT: 0-10

## 2020-10-30 ASSESSMENT — LIFESTYLE VARIABLES: SMOKING_STATUS: 1

## 2020-10-30 NOTE — ANESTHESIA PRE PROCEDURE
Department of Anesthesiology  Preprocedure Note       Name:  Danny Bloom   Age:  39 y.o.  :  1975                                          MRN:  [id number]         Date:  10/30/2020      Surgeon: Rita Watt):  Mars Falcon MD    Procedure: Procedure(s):  ROBOTIC CHOLECYSTECTOMY, POSSIBLE OPEN PROCEDURE    Medications prior to admission:   Prior to Admission medications    Medication Sig Start Date End Date Taking? Authorizing Provider   hydroCHLOROthiazide (MICROZIDE) 12.5 MG capsule Take 1 capsule by mouth daily 10/14/20  Yes Leah Crook,        Current medications:    No current facility-administered medications for this encounter. Current Outpatient Medications   Medication Sig Dispense Refill    hydroCHLOROthiazide (MICROZIDE) 12.5 MG capsule Take 1 capsule by mouth daily 30 capsule 1       Allergies: Allergies   Allergen Reactions    Penicillins        Problem List:    Patient Active Problem List   Diagnosis Code    Anemia D64.9    Anxiety F41.9    Hypertension I10    Bartholin's Gland Abscess--right N75.1       Past Medical History:        Diagnosis Date    Anemia     BORDERLINE--NO MEDS    Anxiety     Fibroid     Hormone disorder     Hypertension     USES HCTZ 12.5    Irregular uterine bleeding     resolved s/p hyst       Past Surgical History:        Procedure Laterality Date     SECTION   & 2003    HYSTERECTOMY  2010    [facility]       Social History:    Social History     Tobacco Use    Smoking status: Current Every Day Smoker     Packs/day: 1.00     Years: 18.00     Pack years: 18.00     Types: Cigarettes    Smokeless tobacco: Never Used   Substance Use Topics    Alcohol use:  No                                Ready to quit: Not Answered  Counseling given: Not Answered      Vital Signs (Current):   Vitals:    10/26/20 1405   Weight: 280 lb (127 kg)   Height: 5' 4\" (1.626 m)                                              BP Readings from Last 3

## 2020-10-30 NOTE — ANESTHESIA POSTPROCEDURE EVALUATION
Department of Anesthesiology  Postprocedure Note    Patient: Avelino Tang  MRN: [id number]  YOB: 1975  Date of evaluation: 10/30/2020  Time:  12:22 PM     Procedure Summary     Date:  10/30/20 Room / Location:  [address] [place] [address] / [facility]    Anesthesia Start:  Dortha Cea Anesthesia Stop:  4780    Procedure:  ROBOTIC CHOLECYSTECTOMY (N/A Abdomen) Diagnosis:  (GALLSTONES)    Surgeon:  Todd Laws MD Responsible Provider:  Phylicia Jonas MD    Anesthesia Type:  general ASA Status:  3          Anesthesia Type: general    Dasia Phase I: Dasia Score: 10    Dasia Phase II: Dasia Score: 10    Last vitals: Reviewed and per EMR flowsheets.        Anesthesia Post Evaluation    Patient location during evaluation: PACU  Level of consciousness: awake  Airway patency: patent  Nausea & Vomiting: no nausea  Complications: no  Cardiovascular status: blood pressure returned to baseline  Respiratory status: acceptable  Hydration status: euvolemic

## 2020-10-30 NOTE — PROGRESS NOTES
Pat done. Instructed to call staff if having signs of fever, cough or sob.
Patient alert and orientedx3, resp even and unlabored. Patient ambulated self to bathroom, gait steady. Patient denies any neeeds.
Patient arousable to verbal stimuli. Patient rates pain 10/10. Warm blankets given to patient. Patient medication given to patient. Will continue to monitor.
Patient sitting up drinking pepsi, tolerating well. Rates pain 2/10.
Phase I (PACU)  1. Patient is identified using name and the date of birth. 2.  The patient is free from signs and symptoms of chemical, electrical, laser, radiation, positioning, or transfer/transport injury. 3.  The patient receives appropriate medication(s), safely administered during the Perioperative period. 4.  The patient has wound/tissue perfusion consistent with or improved from baseline levels established preoperatively. 5.  The patient is at or returning to normothermia at the conclusion of the immediate postoperative period. 6.  The patient's fluid, electrolyte, and acid base balances are consistent with or improved from baseline levels established preoperatively. 7.  The patient's pulmonary function is consistent with or improved from baseline levels established preoperatively. 8.  The patient's cardiovascular status is consistent with or improved from baseline levels established preoperatively. 9.  The patient/caregiver participates in decisions affecting his or her Perioperative plan of care. 10. The patient's care is consistent with the individualized Perioperative plan of care. 11. The patient's right to privacy is maintained. 12. The patient is the recipient of competent and ethical care within legal standards of practice. 13.  The patient's value system, lifestyle, ethnicity, and culture are considered, respected, and incorporated in the Perioperative plan of care. 14.  The patient demonstrates and/or reports adequate pain control throughout the the Perioperative period. 15. The patient's neurological status is consistent with or improved from baseline levels established preoperatively. 16.  The patient/caregiver demonstrates knowledge of the expected responses to the operative or invasive procedure. 17.  Patient/Caregiver has reduced anxiety. Interventions- Familiarize with environment and equipment.   18.  Other:  19.Other:
Pt is alert and oriented. Verbalized understanding of procedure, consent form signed, iv started, pt tolerated well.   Heparin and ic green given
Spoke with  Christiano Lorenzo on phone at this time, went over discharge instructions with Christiano Lorenzo.  verbalizes understanding of instructions. Patient sitting up in bed. NO signs or symptoms of distress. Patient continues to drink pepsi.
surgery. 14. If you have a Living Will and Durable Power of  for Healthcare, please bring in a copy. 13. Notify your Surgeon if you develop any illness between now and surgery  time, cough, cold, fever, sore throat, nausea, vomiting, etc.  Please notify your surgeon if you experience dizziness, shortness of breath or blurred vision between now & the time of your surgery   16. DO NOT shave your operative site 96 hours prior to surgery. For face & neck surgery, men may use an electric razor 48 hours prior to surgery. 17. Shower with _x__Antibacterial soap (x_chlorhexidine for total joint  Pt's) shower two times before surgery.(the morning of and the night before. 18. To provide excellent care visitors will be limited to one in the room at any given time.   Please call pre admission testing if you any further questions [phone] or [phone]

## 2020-10-30 NOTE — H&P
I have reviewed the progress note serving as history and physical dated October/20/ 2020 and examined the patient and find no relevant changes. I have reviewed with the patient and/or family the risks, benefits, and alternatives to the procedure.

## 2020-10-30 NOTE — OP NOTE
[address]                 [address] [address]                                OPERATIVE REPORT    PATIENT NAME: Kali Trimble                    :        1975  MED REC NO:   [id number]                          ROOM:  ACCOUNT NO:   [de-identified]                           ADMIT DATE: 10/30/2020  PROVIDER:     Elena Cole MD    DATE OF PROCEDURE:  10/30/2020    PREOPERATIVE DIAGNOSIS:  Gallstones. POSTOPERATIVE DIAGNOSIS:  Gallstones. OPERATION PERFORMED:  Robotic cholecystectomy. SURGEON:  Chandler Cole MD    ANESTHESIA:  General.    ESTIMATED BLOOD LOSS:  Less than 50 mL. INDICATIONS:  The patient is a 47-year-old woman who presented with  abdominal pain and was found to have gallstones. OPERATIVE SUMMARY:  After preoperative evaluation, the patient was  brought in the operating suite and placed in a comfortable supine  position on the operating room table. Monitoring equipment was attached  and general anesthesia was induced. Her abdomen was sterilely prepped  and draped, and a small incision was made at the inferior aspect of the  umbilicus. This was dissected down to the fascia and a suture of 0  Ethibond was placed on either side of the midline. The midline fascia  was opened and the peritoneal cavity was entered directly. A Jennifer  trocar was inserted and the abdomen was insufflated to a pressure of 15  mmHg. The remaining ports were placed under direct internal  visualization. She was placed in reverse Trendelenburg and rotated to  left and the robot was docked. The gallbladder was grasped and elevated  cephalad over the liver edge. The infundibular structures were  dissected out and the cystic duct and artery were identified. Each was  tracked down to its origin, and the Firefly imaging was used to  facilitate anatomic identification. The cystic duct and artery were  each triply clipped and divided.   The gallbladder was then dissected  free of the liver bed using electrocautery and set aside. The  gallbladder fossa was examined and irrigated. A few small bleeding  areas were cauterized. Following this, there was no evidence of further  bleeding. There was no bile leakage either by direct imaging or Firefly  imaging. The clips were intact in the proximal structures. The robot  was therefore undocked and the gallbladder was withdrawn through the  umbilical trocar site and the ports were removed. The umbilical fascial  defect was closed with a figure-of-eight suture of 0 Ethibond. The  wounds were injected with Marcaine and the skin incisions were closed  with subcuticular sutures of 4-0 Vicryl followed by Benzoin,  Steri-Strips, and dry sterile dressings. All sponge, needle, and  instrument counts were correct at the end of the case. The patient  tolerated the procedure well and was taken to the recovery area in  stable condition.         Hilario Kan MD    D: 10/30/2020 9:53:25       T: 10/30/2020 10:04:04     [email or web address]  Job#: [id number]     Doc#: [id number]    CC:

## 2020-10-30 NOTE — BRIEF OP NOTE
Brief Postoperative Note      Patient: John Juarez  YOB: 1975  MRN: [id number]    Date of Procedure: 10/30/2020    Pre-Op Diagnosis: GALLSTONES    Post-Op Diagnosis: Same       Procedure(s):  ROBOTIC CHOLECYSTECTOMY    Surgeon(s):  Jagruti Catalan MD    Assistant:  Surgical Assistant: Roxane Puckett    Anesthesia: General    Estimated Blood Loss (mL): less than 50     Complications: None    Specimens:   ID Type Source Tests Collected by Time Destination   A :  Tissue Gallbladder SURGICAL PATHOLOGY Jagruti Catalan MD 10/30/2020 0084        Implants:  * No implants in log *      Drains:   NG/OG/NJ/NE Tube Orogastric 18 fr Right mouth (Active)       Findings: as above    Electronically signed by Joaquim Severance, MD on 10/30/2020 at 9:37 AM

## 2020-11-12 ENCOUNTER — OFFICE VISIT (OUTPATIENT)
Dept: SURGERY | Age: 45
End: 2020-11-12

## 2020-11-12 VITALS
DIASTOLIC BLOOD PRESSURE: 78 MMHG | BODY MASS INDEX: 47.8 KG/M2 | WEIGHT: 280 LBS | TEMPERATURE: 97.5 F | HEIGHT: 64 IN | SYSTOLIC BLOOD PRESSURE: 126 MMHG

## 2020-11-12 PROCEDURE — 99024 POSTOP FOLLOW-UP VISIT: CPT | Performed by: SURGERY

## 2020-11-12 NOTE — PROGRESS NOTES
[facility] GENERAL SURGERY      S:   Patient presents s/p robotic cholecystectomy. She reports doing well. O:   Comfortable         Incision sites healing well. A:   S/P robotic cholecystectomy    P:   Follow up as needed.

## 2021-03-10 ENCOUNTER — OFFICE VISIT (OUTPATIENT)
Dept: ORTHOPEDIC SURGERY | Age: 46
End: 2021-03-10
Payer: COMMERCIAL

## 2021-03-10 VITALS — RESPIRATION RATE: 14 BRPM | HEIGHT: 64 IN | BODY MASS INDEX: 49.44 KG/M2 | WEIGHT: 289.6 LBS | TEMPERATURE: 97.5 F

## 2021-03-10 DIAGNOSIS — M25.461 EFFUSION OF RIGHT KNEE: ICD-10-CM

## 2021-03-10 DIAGNOSIS — M25.561 RIGHT KNEE PAIN, UNSPECIFIED CHRONICITY: Primary | ICD-10-CM

## 2021-03-10 PROCEDURE — 99203 OFFICE O/P NEW LOW 30 MIN: CPT | Performed by: ORTHOPAEDIC SURGERY

## 2021-03-10 NOTE — PROGRESS NOTES
CHIEF COMPLAINT: Right knee pain. History:   Renae Mc is a 39 y.o. female self-referred for evaluation and treatment of right knee pain / injury. The patient complains of right knee pain. This is evaluated as a personal injury. The pain began 8 months ago. Rate pain 7/10. There was not a history of injury. She thinks the pain started after she moved into her new house, where that she has a lot of stairs. The pain is located anterior medial.   The knee has not given out or felt unstable. The patient can bend and straighten the knee fully. There is swelling in the knee. There was catching / locking of the knee. The patient has not had PT. The patient has not had an injection. The patient has taken NSAIDs. The patient has tried ice. The patient's occupation is [employer] and works in a beauty supply store also. .    Outside reports reviewed: none.     Past Medical History:   Diagnosis Date    Anemia     BORDERLINE--NO MEDS    Anxiety     Fibroid     Hormone disorder     Hypertension     USES HCTZ 12.5    Irregular uterine bleeding     resolved s/p hyst       Past Surgical History:   Procedure Laterality Date     SECTION   &     CHOLECYSTECTOMY, LAPAROSCOPIC N/A 10/30/2020    ROBOTIC CHOLECYSTECTOMY performed by Zina Mattson MD at [address] [facility]  2010    [facility]    OTHER SURGICAL HISTORY  10/30/2020     ROBOTIC CHOLECYSTECTOMY (N/A Abdomen)        Family History   Problem Relation Age of Onset    Hypertension Mother     Stroke Mother     Cancer Sister         RECTAL    High Cholesterol Brother     Dementia Father        Social History     Socioeconomic History    Marital status:      Spouse name: None    Number of children: None    Years of education: None    Highest education level: None   Occupational History    None   Social Needs    Financial resource strain: None    Food insecurity     Worry: None     Inability: None    Transportation needs Medical: None     Non-medical: None   Tobacco Use    Smoking status: Current Every Day Smoker     Packs/day: 1.00     Years: 20.00     Pack years: 20.00     Types: Cigarettes     Start date: 2001    Smokeless tobacco: Never Used   Substance and Sexual Activity    Alcohol use: No    Drug use: No    Sexual activity: None   Lifestyle    Physical activity     Days per week: None     Minutes per session: None    Stress: None   Relationships    Social connections     Talks on phone: None     Gets together: None     Attends Zoroastrianism service: None     Active member of club or organization: None     Attends meetings of clubs or organizations: None     Relationship status: None    Intimate partner violence     Fear of current or ex partner: None     Emotionally abused: None     Physically abused: None     Forced sexual activity: None   Other Topics Concern    None   Social History Narrative    None       Current Outpatient Medications   Medication Sig Dispense Refill    VITAMIN D PO Take by mouth      Probiotic Product (PROBIOTIC PO) Take by mouth      hydroCHLOROthiazide (MICROZIDE) 12.5 MG capsule Take 1 capsule by mouth daily 30 capsule 1     No current facility-administered medications for this visit. Allergies   Allergen Reactions    Penicillins        Review of Systems:  I have reviewed the clinically relevant past medical history, medications, allergies, family history, social history, and 13 point Review of Systems from the patient's recent history form & documented any details relevant to today's presenting complaints in the history above. The patient's self-reported past medical history, medications, allergies, family history, social history, and Review of Systems form from 3/10/21 have been scanned into the chart under the \"Media\" tab.       Physical Examination:     Vital signs:   Temp 97.5 °F (36.4 °C) (Infrared)   Resp 14   Ht 5' 4\" (1.626 m)   Wt 289 lb 9.6 oz (131.4 kg)   LMP 09/13/2009   BMI 49.71 kg/m²     General:  alert, appears stated age, cooperative and no distress   Gait:  Normal. The patient can bear weight on the injured extremity. Right Knee  Alignment:  neutral   ROM:  0 degrees extension to 120 degrees flexion   Bilateral knees   Crepitus:  no   Joint Tenderness:  medial joint line   Effusion:   40-50 cc   Patellar excursion:  2 of 4 quadrants    Patellar tilt test:  positive   Patellar facet tenderness:  negative medial   negative lateral   Patellar apprehension test:  negative   Lachman test:  negative   Left knee: negative   Anterior drawer test:  negative   Left knee: negative   Posterior drawer:   negative    Left knee: negative   Varus laxity at 30 degrees:  negative   Left knee: negative   Valgus laxity at 30 degrees:   negative   Left knee: negative   Sandie's test:  positive   Left knee: negative     There is not any cellulitis, lymphedema or cutaneous lesions noted in the lower extremities. Motor exam of the lower extremities show quadriceps, hamstrings, foot dorsiflexion and plantarflexion grossly intact. Sensation to both feet is grossly intact to light touch. The bilateral lower extremities are warm and well-perfused with brisk capillary refill. Imaging:  Right Knee X-Ray: 3 view x-rays of the knee, including bilateral AP and sunrise and lateral of the affected side were obtained and reviewed. No fracture or dislocation. Normal joint spaces. Right Knee MRI: ordered, but not yet obtained      Assessment:     Right knee internal derangement, possible medial meniscus tear  Morbid obesity      Plan:     Natural history and expected course discussed. Questions answered. Ordered MRI of her right knee to evaluate her medial meniscus given the effusion as well as medial joint line tenderness and positive Sandie's test.    Ice as needed. NSAIDs as needed. Follow-up after MRI. Romel Costello.  Charlette Montgomery MD  Orthopaedic Surgery and Sports Medicine     This note was generated with use of a verbal recognition program (DRAGON) and was checked for errors. It is possible that there are still dictated errors within this office note. If so, please bring any errors to my attention for an addendum. All efforts were made to ensure that this office note is accurate.

## 2021-03-11 ENCOUNTER — TELEPHONE (OUTPATIENT)
Dept: ORTHOPEDIC SURGERY | Age: 46
End: 2021-03-11

## 2021-03-11 NOTE — TELEPHONE ENCOUNTER
Attempted to contact patient regarding MRI R KNEE approval and authorization being valid until 6/7/2021. Patient was instructed that their MRI has been scheduled at [facility] for 3/15 with 9:45am arrival.     The patient has elected to contact the office at a later time to schedule a follow up appointment. They were asked to allow a minimum 48 hours for follow up appointment from MRI scan in order for staff to obtain MRI report. The patient was provided contact information should the need arise to reschedule any of the appointments. Attempt #1: vm is not set up, unable to l/m. Will try again at a later time.

## 2021-03-19 NOTE — TELEPHONE ENCOUNTER
Attempted to contact patient regarding scheduling a follow up appointment to review MRI results; however, vm is still not set up at this time.

## 2021-03-24 ENCOUNTER — OFFICE VISIT (OUTPATIENT)
Dept: ORTHOPEDIC SURGERY | Age: 46
End: 2021-03-24
Payer: COMMERCIAL

## 2021-03-24 VITALS — HEIGHT: 64 IN | TEMPERATURE: 97.4 F | BODY MASS INDEX: 49.75 KG/M2 | WEIGHT: 291.4 LBS

## 2021-03-24 DIAGNOSIS — M25.561 RIGHT KNEE PAIN, UNSPECIFIED CHRONICITY: ICD-10-CM

## 2021-03-24 DIAGNOSIS — M25.461 EFFUSION OF RIGHT KNEE: ICD-10-CM

## 2021-03-24 DIAGNOSIS — M25.561 RIGHT KNEE PAIN, UNSPECIFIED CHRONICITY: Primary | ICD-10-CM

## 2021-03-24 LAB
APPEARANCE FLUID: NORMAL
BASOPHILS ABSOLUTE: 0.1 K/UL (ref 0–0.2)
BASOPHILS RELATIVE PERCENT: 0.9 %
CELL COUNT FLUID TYPE: NORMAL
CLOT EVALUATION: NORMAL
COLOR FLUID: YELLOW
CRYSTALS, FLUID: NORMAL
EOSINOPHILS ABSOLUTE: 0.2 K/UL (ref 0–0.6)
EOSINOPHILS RELATIVE PERCENT: 2.8 %
HCT VFR BLD CALC: 46.7 % (ref 36–48)
HEMOGLOBIN: 15.1 G/DL (ref 12–16)
LYMPHOCYTES ABSOLUTE: 2.1 K/UL (ref 1–5.1)
LYMPHOCYTES RELATIVE PERCENT: 24.9 %
LYMPHOCYTES, BODY FLUID: 44 %
MCH RBC QN AUTO: 27 PG (ref 26–34)
MCHC RBC AUTO-ENTMCNC: 32.4 G/DL (ref 31–36)
MCV RBC AUTO: 83.6 FL (ref 80–100)
MONOCYTE, FLUID: 16 %
MONOCYTES ABSOLUTE: 0.4 K/UL (ref 0–1.3)
MONOCYTES RELATIVE PERCENT: 5.1 %
NEUTROPHIL, FLUID: 40 %
NEUTROPHILS ABSOLUTE: 5.6 K/UL (ref 1.7–7.7)
NEUTROPHILS RELATIVE PERCENT: 66.3 %
NUCLEATED CELLS FLUID: 234 /CUMM
NUMBER OF CELLS COUNTED FLUID: 100
PDW BLD-RTO: 14.2 % (ref 12.4–15.4)
PLATELET # BLD: 171 K/UL (ref 135–450)
PMV BLD AUTO: 8.5 FL (ref 5–10.5)
RBC # BLD: 5.6 M/UL (ref 4–5.2)
RBC FLUID: 1917 /CUMM
SOURCE BODY FLUID: NORMAL
WBC # BLD: 8.5 K/UL (ref 4–11)

## 2021-03-24 PROCEDURE — 99213 OFFICE O/P EST LOW 20 MIN: CPT | Performed by: ORTHOPAEDIC SURGERY

## 2021-03-24 PROCEDURE — 20610 DRAIN/INJ JOINT/BURSA W/O US: CPT | Performed by: ORTHOPAEDIC SURGERY

## 2021-03-24 NOTE — PROGRESS NOTES
CHIEF COMPLAINT: Right knee pain. History:   Nilda Brown is a 39 y.o. female here for right knee follow-up. Initial history: self-referred for evaluation and treatment of right knee pain / injury. The patient complains of right knee pain. This is evaluated as a personal injury. The pain began 8 months ago. Rate pain 7/10. There was not a history of injury. She thinks the pain started after she moved into her new house, where that she has a lot of stairs. The pain is located anterior medial.   The knee has not given out or felt unstable. The patient can bend and straighten the knee fully. There is swelling in the knee. There was catching / locking of the knee. The patient has not had PT. The patient has not had an injection. The patient has taken NSAIDs. The patient has tried ice. 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 .    Interval History: Knee feels about the same. She rates pain 2/10. The brace helps.         Past Medical History:   Diagnosis Date    Anemia     BORDERLINE--NO MEDS    Anxiety     Fibroid     Hormone disorder     Hypertension     USES HCTZ 12.5    Irregular uterine bleeding     resolved s/p hyst       Past Surgical History:   Procedure Laterality Date     SECTION   &     CHOLECYSTECTOMY, LAPAROSCOPIC N/A 10/30/2020    ROBOTIC CHOLECYSTECTOMY performed by Octavia Milner [facility] at [facility]  2010    [facility]    OTHER SURGICAL HISTORY  10/30/2020     ROBOTIC CHOLECYSTECTOMY (N/A Abdomen)        Family History   Problem Relation Age of Onset    Hypertension Mother     Stroke Mother     Cancer Sister         RECTAL    High Cholesterol Brother     Dementia Father        Social History     Socioeconomic History    Marital status:      Spouse name: Not on file    Number of children: Not on file    Years of education: Not on file    Highest education level: Not on file   Occupational History    allergies, family history, social history, and Review of Systems form from 3/10/21 have been scanned into the chart under the \"Media\" tab. Physical Examination:     Vital signs:   LMP 09/13/2009     General:  alert, appears stated age, cooperative and no distress   Gait:  Normal. The patient can bear weight on the injured extremity. Right Knee  Alignment:  neutral   ROM:  0 degrees extension to 120 degrees flexion   Bilateral knees   Crepitus:  no   Joint Tenderness:  medial joint line   Effusion:   40-50 cc   Patellar excursion:  2 of 4 quadrants    Patellar tilt test:  positive   Patellar facet tenderness:  negative medial   negative lateral   Patellar apprehension test:  negative   Lachman test:  negative   Left knee: negative   Anterior drawer test:  negative   Left knee: negative   Posterior drawer:   negative    Left knee: negative   Varus laxity at 30 degrees:  negative   Left knee: negative   Valgus laxity at 30 degrees:   negative   Left knee: negative   Sandie's test:  positive   Left knee: negative     There is not any cellulitis, lymphedema or cutaneous lesions noted in the lower extremities. Motor exam of the lower extremities show quadriceps, hamstrings, foot dorsiflexion and plantarflexion grossly intact. Sensation to both feet is grossly intact to light touch. The bilateral lower extremities are warm and well-perfused with brisk capillary refill. Imaging:  Right Knee X-Ray 3/10/21:  No fracture or dislocation. Normal joint spaces. Right Knee MRI 3/15/21 Proscan: I independently reviewed the images, as well as the radiology report. 1. Marrow signal alteration extending into the epiphysis.  Consider laboratory/clinical work up   Harsha Brothers myelophthisic disorders (ESR, Hgb, WBC, blood smear, urine Bence Alves protein, etc.). 2. Intermediate-grade chondromalacia posterolateral tibia. 3. Intermediate-grade chondromalacia apex of the patella and lateral facet.    4. Effusion. Yonny Estrada

## 2021-03-25 LAB
ANION GAP SERPL CALCULATED.3IONS-SCNC: 11 MMOL/L (ref 3–16)
ANTI-NUCLEAR ANTIBODY (ANA): NEGATIVE
BUN BLDV-MCNC: 7 MG/DL (ref 7–20)
C-REACTIVE PROTEIN: 23.2 MG/L (ref 0–5.1)
CALCIUM SERPL-MCNC: 9.7 MG/DL (ref 8.3–10.6)
CHLORIDE BLD-SCNC: 104 MMOL/L (ref 99–110)
CO2: 27 MMOL/L (ref 21–32)
CREAT SERPL-MCNC: 0.6 MG/DL (ref 0.6–1.1)
GFR AFRICAN AMERICAN: >60
GFR NON-AFRICAN AMERICAN: >60
GLUCOSE BLD-MCNC: 127 MG/DL (ref 70–99)
POTASSIUM SERPL-SCNC: 4.2 MMOL/L (ref 3.5–5.1)
RHEUMATOID FACTOR: <10 IU/ML
SEDIMENTATION RATE, ERYTHROCYTE: 10 MM/HR (ref 0–20)
SODIUM BLD-SCNC: 142 MMOL/L (ref 136–145)

## 2021-03-26 LAB
ALBUMIN SERPL-MCNC: 3.2 G/DL (ref 3.1–4.9)
ALPHA-1-GLOBULIN: 0.3 G/DL (ref 0.2–0.4)
ALPHA-2-GLOBULIN: 0.8 G/DL (ref 0.4–1.1)
BETA GLOBULIN: 1.1 G/DL (ref 0.9–1.6)
GAMMA GLOBULIN: 0.9 G/DL (ref 0.6–1.8)
SPE/IFE INTERPRETATION: NORMAL
TOTAL PROTEIN: 6.3 G/DL (ref 6.4–8.2)

## 2021-03-31 DIAGNOSIS — M25.561 RIGHT KNEE PAIN, UNSPECIFIED CHRONICITY: ICD-10-CM

## 2021-03-31 DIAGNOSIS — M25.461 EFFUSION OF RIGHT KNEE: ICD-10-CM

## 2021-03-31 LAB
24HR URINE VOLUME (ML): 1900 ML
PROTEIN 24 HOUR URINE: 0.13 G/24HR

## 2021-04-02 LAB — URINE ELECTROPHORESIS INTERP: NORMAL

## 2021-04-14 ENCOUNTER — OFFICE VISIT (OUTPATIENT)
Dept: ORTHOPEDIC SURGERY | Age: 46
End: 2021-04-14
Payer: COMMERCIAL

## 2021-04-14 VITALS — TEMPERATURE: 97.2 F | WEIGHT: 291 LBS | BODY MASS INDEX: 51.56 KG/M2 | HEIGHT: 63 IN | RESPIRATION RATE: 14 BRPM

## 2021-04-14 DIAGNOSIS — M94.261 CHONDROMALACIA OF RIGHT KNEE: Primary | ICD-10-CM

## 2021-04-14 PROCEDURE — 99213 OFFICE O/P EST LOW 20 MIN: CPT | Performed by: ORTHOPAEDIC SURGERY

## 2021-04-14 NOTE — PROGRESS NOTES
CHIEF COMPLAINT: Right knee pain. History:   Shaniqua Purcell is a 39 y.o. female here for right knee follow-up. Initial history: self-referred for evaluation and treatment of right knee pain / injury. The patient complains of right knee pain. This is evaluated as a personal injury. The pain began 8 months ago. Rate pain 7/10. There was not a history of injury. She thinks the pain started after she moved into her new house, where that she has a lot of stairs. The pain is located anterior medial.   The knee has not given out or felt unstable. The patient can bend and straighten the knee fully. There is swelling in the knee. There was catching / locking of the knee. The patient has not had PT. The patient has not had an injection. The patient has taken NSAIDs. The patient has tried ice. The patient's occupation is [employer] and works in a beauty supply store also. .    Interval History: Knee feels somewhat better. She rates pain 2/10. She has some trouble squatting/kneeling. .        Past Medical History:   Diagnosis Date    Anemia     BORDERLINE--NO MEDS    Anxiety     Fibroid     Hormone disorder     Hypertension     USES HCTZ 12.5    Irregular uterine bleeding     resolved s/p hyst       Past Surgical History:   Procedure Laterality Date     SECTION   &     CHOLECYSTECTOMY, LAPAROSCOPIC N/A 10/30/2020    ROBOTIC CHOLECYSTECTOMY performed by Girma [facility]cas[facility] at [facility]  2010    [facility]    OTHER SURGICAL HISTORY  10/30/2020     ROBOTIC CHOLECYSTECTOMY (N/A Abdomen)        Family History   Problem Relation Age of Onset    Hypertension Mother     Stroke Mother     Cancer Sister         RECTAL    High Cholesterol Brother     Dementia Father        Social History     Socioeconomic History    Marital status:      Spouse name: None    Number of children: None    Years of education: None    Highest education level: None   Occupational History    None   Social Needs    Financial resource strain: None    Food insecurity     Worry: None     Inability: None    Transportation needs     Medical: None     Non-medical: None   Tobacco Use    Smoking status: Current Every Day Smoker     Packs/day: 1.00     Years: 20.00     Pack years: 20.00     Types: Cigarettes     Start date: 2001    Smokeless tobacco: Never Used   Substance and Sexual Activity    Alcohol use: No    Drug use: No    Sexual activity: None   Lifestyle    Physical activity     Days per week: None     Minutes per session: None    Stress: None   Relationships    Social connections     Talks on phone: None     Gets together: None     Attends Episcopalian service: None     Active member of club or organization: None     Attends meetings of clubs or organizations: None     Relationship status: None    Intimate partner violence     Fear of current or ex partner: None     Emotionally abused: None     Physically abused: None     Forced sexual activity: None   Other Topics Concern    None   Social History Narrative    None       Current Outpatient Medications   Medication Sig Dispense Refill    VITAMIN D PO Take by mouth      Probiotic Product (PROBIOTIC PO) Take by mouth      hydroCHLOROthiazide (MICROZIDE) 12.5 MG capsule Take 1 capsule by mouth daily 30 capsule 1     No current facility-administered medications for this visit. Allergies   Allergen Reactions    Penicillins        Review of Systems:  I have reviewed the clinically relevant past medical history, medications, allergies, family history, social history, and 13 point Review of Systems from the patient's recent history form & documented any details relevant to today's presenting complaints in the history above. The patient's self-reported past medical history, medications, allergies, family history, social history, and Review of Systems form from 3/10/21 have been scanned into the chart under the \"Media\" tab.       Physical subtle radial tear at the posterior    horn-root junction, 4 mm. 6. Consider excluding inflammatory arthropathy with lab analysis and potential synovial    aspiration. CBC, ESR, serum/urine protein electrophoresis, BETSY, rheumatoid factor, BETSY: All within normal limits  CRP: 23.2      Assessment:     Right knee chondromalacia  Morbid obesity      Plan:     Discussed that all her blood work was normal.  I think her effusion is likely from inflammation and from her chondromalacia. Ice as needed. NSAIDs as needed. She states that the ibuprofen does help. Offered her corticosteroid injection today. She deferred at this time. Also consider Medrol Dosepak in the future. Discussed weight loss. Discussed leg strengthening. Discussed low impact exercise. Follow-up as needed. Carltio Ortega. Qi Mireles MD  Orthopaedic Surgery and Sports Medicine     This note was generated with use of a verbal recognition program [facility] [employer]) and was checked for errors. It is possible that there are still dictated errors within this office note. If so, please bring any errors to my attention for an addendum. All efforts were made to ensure that this office note is accurate.

## 2021-08-26 ENCOUNTER — HOSPITAL ENCOUNTER (OUTPATIENT)
Dept: ULTRASOUND IMAGING | Age: 46
Discharge: HOME | End: 2021-08-26
Payer: COMMERCIAL

## 2021-08-26 DIAGNOSIS — R19.09 ABDOMINAL MASS OF OTHER SITE: ICD-10-CM

## 2021-08-26 PROCEDURE — 76705 ECHO EXAM OF ABDOMEN: CPT

## 2021-08-26 PROCEDURE — 76999 ECHO EXAMINATION PROCEDURE: CPT

## 2021-09-23 ENCOUNTER — INITIAL CONSULT (OUTPATIENT)
Dept: SURGERY | Age: 46
End: 2021-09-23
Payer: COMMERCIAL

## 2021-09-23 VITALS
HEIGHT: 63 IN | WEIGHT: 256 LBS | BODY MASS INDEX: 45.36 KG/M2 | DIASTOLIC BLOOD PRESSURE: 70 MMHG | SYSTOLIC BLOOD PRESSURE: 124 MMHG

## 2021-09-23 DIAGNOSIS — R22.2 ABDOMINAL WALL MASS: Primary | ICD-10-CM

## 2021-09-23 PROCEDURE — 99213 OFFICE O/P EST LOW 20 MIN: CPT | Performed by: SURGERY

## 2021-09-23 RX ORDER — LISINOPRIL 2.5 MG/1
2.5 TABLET ORAL DAILY
COMMUNITY
Start: 2021-08-14

## 2021-09-23 NOTE — PROGRESS NOTES
[facility]    CHIEF COMPLAINT: Abdominal mass    SUBJECTIVE:   Patient presents for evaluation of a mass in her lower abdomen. She reports she has had this for about 5 months. She states it is uncomfortable. She has a burning pain that radiates out from the area. It does not go away when she lays down. She denies nausea or vomiting. Her bowels have been working normally.     Allergies   Allergen Reactions    Penicillins      Outpatient Medications Marked as Taking for the 21 encounter (Initial consult) with Alma Oliveros MD   Medication Sig Dispense Refill    lisinopril (PRINIVIL;ZESTRIL) 2.5 MG tablet       VITAMIN D PO Take by mouth      hydroCHLOROthiazide (MICROZIDE) 12.5 MG capsule Take 1 capsule by mouth daily 30 capsule 1       Past Medical History:   Diagnosis Date    Anemia     BORDERLINE--NO MEDS    Anxiety     Fibroid     Hormone disorder     Hypertension     USES HCTZ 12.5    Irregular uterine bleeding     resolved s/p hyst     Past Surgical History:   Procedure Laterality Date     SECTION   &     CHOLECYSTECTOMY, LAPAROSCOPIC N/A 10/30/2020    ROBOTIC CHOLECYSTECTOMY performed by Alma Oliveros MD at [facility]  2010    [facility]    OTHER SURGICAL HISTORY  10/30/2020     ROBOTIC CHOLECYSTECTOMY (N/A Abdomen)      Family History   Problem Relation Age of Onset    Hypertension Mother     Stroke Mother     Cancer Sister         RECTAL    High Cholesterol Brother     Dementia Father      Social History     Socioeconomic History    Marital status:      Spouse name: Not on file    Number of children: Not on file    Years of education: Not on file    Highest education level: Not on file   Occupational History    Not on file   Tobacco Use    Smoking status: Current Every Day Smoker     Packs/day: 1.00     Years: 20.00     Pack years: 20.00     Types: Cigarettes     Start date:     Smokeless tobacco: Never Used Substance and Sexual Activity    Alcohol use: No    Drug use: No    Sexual activity: Not on file   Other Topics Concern    Not on file   Social History Narrative    Not on file     Social Determinants of Health     Financial Resource Strain:     Difficulty of Paying Living Expenses:    Food Insecurity:     Worried About Running Out of Food in the Last Year:     [address] in the Last Year:    Transportation Needs:     Lack of Transportation (Medical):  Lack of Transportation (Non-Medical):    Physical Activity:     Days of Exercise per Week:     Minutes of Exercise per Session:    Stress:     Feeling of Stress :    Social Connections:     Frequency of Communication with Friends and Family:     Frequency of Social Gatherings with Friends and Family:     Attends Yazdanism Services:     Active Member of Clubs or Organizations:     Attends Club or Organization Meetings:     Marital Status:    Intimate Partner Violence:     Fear of Current or Ex-Partner:     Emotionally Abused:     Physically Abused:     Sexually Abused:           Vitals:    09/23/21 0907   BP: 124/70   Site: Left Upper Arm   Position: Sitting   Cuff Size: Large Adult   Weight: 256 lb (116.1 kg)   Height: 5' 3\" (1.6 m)     Body mass index is 45.35 kg/m². ROS:  As per HPI, otherwise reviewed and negative    PHYSICAL EXAM:     Constitutional:  Well developed, well nourished, no acute distress, non-toxic appearance   Respiratory:  No respiratory distress, normal breath sounds, no rales, no wheezing   Cardiovascular:  Normal rate, normal rhythm, no murmurs.   GI: Bowel sounds positive, soft, there is a tender fullness in the right suprapubic area however, delineation is difficult due to her body habitus  Integument: Warm and dry  Neurologic:  Alert & oriented x 3, normal motor function, normal sensory function, no focal deficits noted   Psychiatric:  Speech and behavior appropriate             DATA:  Radiology Review: Ultrasound shows a focal soft tissue nodule in this area. No corresponding finding was seen on the prior CT    ASSESSMENT:   1. Abdominal wall mass           PLAN: CT scan will be obtained for more definitive evaluation. Further treatment plan will be based on the results of additional imaging.

## 2021-09-29 ENCOUNTER — HOSPITAL ENCOUNTER (OUTPATIENT)
Dept: CT IMAGING | Age: 46
Discharge: HOME | End: 2021-09-29
Payer: COMMERCIAL

## 2021-09-29 ENCOUNTER — HOSPITAL ENCOUNTER (OUTPATIENT)
Age: 46
Discharge: HOME | End: 2021-09-29
Payer: COMMERCIAL

## 2021-09-29 DIAGNOSIS — R22.2 ABDOMINAL WALL MASS: ICD-10-CM

## 2021-09-29 LAB
ANION GAP SERPL CALCULATED.3IONS-SCNC: 11 MMOL/L (ref 3–16)
BUN BLDV-MCNC: 9 MG/DL (ref 7–20)
CALCIUM SERPL-MCNC: 10.3 MG/DL (ref 8.3–10.6)
CHLORIDE BLD-SCNC: 105 MMOL/L (ref 99–110)
CO2: 23 MMOL/L (ref 21–32)
CREAT SERPL-MCNC: 0.6 MG/DL (ref 0.6–1.1)
GFR AFRICAN AMERICAN: >60
GFR NON-AFRICAN AMERICAN: >60
GLUCOSE BLD-MCNC: 92 MG/DL (ref 70–99)
POTASSIUM SERPL-SCNC: 4 MMOL/L (ref 3.5–5.1)
SODIUM BLD-SCNC: 139 MMOL/L (ref 136–145)

## 2021-09-29 PROCEDURE — 6360000004 HC RX CONTRAST MEDICATION: Performed by: SURGERY

## 2021-09-29 PROCEDURE — 36415 COLL VENOUS BLD VENIPUNCTURE: CPT

## 2021-09-29 PROCEDURE — 74177 CT ABD & PELVIS W/CONTRAST: CPT

## 2021-09-29 PROCEDURE — 80048 BASIC METABOLIC PNL TOTAL CA: CPT

## 2021-09-29 RX ADMIN — IOPAMIDOL 75 ML: 755 INJECTION, SOLUTION INTRAVENOUS at 15:46

## 2021-10-01 ENCOUNTER — TELEPHONE (OUTPATIENT)
Dept: SURGERY | Age: 46
End: 2021-10-01

## 2021-10-04 ENCOUNTER — TELEPHONE (OUTPATIENT)
Dept: SURGERY | Age: 46
End: 2021-10-04

## 2021-10-04 DIAGNOSIS — Z01.818 PRE-OP TESTING: Primary | ICD-10-CM

## 2021-10-19 ENCOUNTER — HOSPITAL ENCOUNTER (OUTPATIENT)
Age: 46
Discharge: HOME | End: 2021-10-19
Payer: COMMERCIAL

## 2021-10-19 ENCOUNTER — HOSPITAL ENCOUNTER (OUTPATIENT)
Dept: GENERAL RADIOLOGY | Age: 46
Discharge: HOME | End: 2021-10-19
Payer: COMMERCIAL

## 2021-10-19 DIAGNOSIS — Z20.828 CONTACT WITH AND (SUSPECTED) EXPOSURE TO OTHER VIRAL COMMUNICABLE DISEASES: ICD-10-CM

## 2021-10-19 PROCEDURE — 71046 X-RAY EXAM CHEST 2 VIEWS: CPT

## 2021-10-25 ENCOUNTER — ANESTHESIA EVENT (OUTPATIENT)
Dept: OPERATING ROOM | Age: 46
End: 2021-10-25
Payer: COMMERCIAL

## 2021-10-25 ENCOUNTER — HOSPITAL ENCOUNTER (OUTPATIENT)
Age: 46
Discharge: HOME | End: 2021-10-25
Payer: COMMERCIAL

## 2021-10-25 PROCEDURE — U0003 INFECTIOUS AGENT DETECTION BY NUCLEIC ACID (DNA OR RNA); SEVERE ACUTE RESPIRATORY SYNDROME CORONAVIRUS 2 (SARS-COV-2) (CORONAVIRUS DISEASE [COVID-19]), AMPLIFIED PROBE TECHNIQUE, MAKING USE OF HIGH THROUGHPUT TECHNOLOGIES AS DESCRIBED BY CMS-2020-01-R: HCPCS

## 2021-10-25 PROCEDURE — U0005 INFEC AGEN DETEC AMPLI PROBE: HCPCS

## 2021-10-25 NOTE — PROGRESS NOTES
PRE OP INSTRUCTION SHEET   1. Do not eat or drink anything after 12 midnight  prior to surgery. This includes no water, chewing gum or mints. 2. Take the following pills will a small sip of water (see MAR)                                        3. Aspirin, Ibuprofen, Advil, Naproxen, Vitamin E, fish oil and other Anti-inflammatory products should be stopped for 5 days before surgery or as directed by your physician. 4. Check with your Doctor regarding stopping Plavix, Coumadin, Lovenox, Fragmin or other blood thinners   5. Do not smoke, and do not drink any alcoholic beverages 24 hours prior to surgery. This includes NA Beer. 6. You may brush your teeth and gargle the morning of surgery. DO NOT SWALLOW WATER   7. You MUST make arrangements for a responsible adult to take you home after your surgery. You will not be allowed to leave alone or drive yourself home. It is strongly suggested someone stay with you the first 24 hrs. Your surgery will be cancelled if you do not have a ride home. 8. A parent/legal guardian must accompany a child scheduled for surgery and plan to stay at the hospital until the child is discharged. Please do not bring other children with you. 9. Please wear simple, loose fitting clothing to the hospital.  Dordandy Gómez not bring valuables (money, credit cards, checkbooks, etc.) Do not wear any makeup (including no eye makeup) or nail polish on your fingers or toes. 10. DO NOT wear any jewelry or piercings on day of surgery. All body piercing jewelry must be removed. 11. If you have dentures,glasses, or contacts they will be removed before going to the OR; we will provide you a container. 12. Please see your family doctor/and cardiologist for a history & physical and/or concerning medications. Bring any test results/reports from your physician's office. Have history and labs faxed to [phone].  Remember to bring Blood Bank bracelet on the day of surgery. 14. If you have a Living Will and Durable Power of  for Healthcare, please bring in a copy. 13. Notify your Surgeon if you develop any illness between now and surgery  time, cough, cold, fever, sore throat, nausea, vomiting, etc.  Please notify your surgeon if you experience dizziness, shortness of breath or blurred vision between now & the time of your surgery   16. DO NOT shave your operative site 96 hours prior to surgery. For face & neck surgery, men may use an electric razor 48 hours prior to surgery. 17. Shower with _x__Antibacterial soap (x_chlorhexidine for total joint  Pt's) shower two times before surgery.(the morning of and the night before. 18. To provide excellent care visitors will be limited to one in the room at any given time.   Please call pre admission testing if you any further questions [phone] or [phone]

## 2021-10-26 LAB — SARS-COV-2: NOT DETECTED

## 2021-10-28 ENCOUNTER — HOSPITAL ENCOUNTER (OUTPATIENT)
Age: 46
Setting detail: OUTPATIENT SURGERY
Discharge: HOME | End: 2021-10-28
Attending: SURGERY | Admitting: SURGERY
Payer: COMMERCIAL

## 2021-10-28 ENCOUNTER — ANESTHESIA (OUTPATIENT)
Dept: OPERATING ROOM | Age: 46
End: 2021-10-28
Payer: COMMERCIAL

## 2021-10-28 VITALS
WEIGHT: 253 LBS | HEART RATE: 85 BPM | DIASTOLIC BLOOD PRESSURE: 81 MMHG | OXYGEN SATURATION: 95 % | TEMPERATURE: 97.4 F | RESPIRATION RATE: 17 BRPM | HEIGHT: 64 IN | SYSTOLIC BLOOD PRESSURE: 112 MMHG | BODY MASS INDEX: 43.19 KG/M2

## 2021-10-28 VITALS
RESPIRATION RATE: 23 BRPM | DIASTOLIC BLOOD PRESSURE: 63 MMHG | OXYGEN SATURATION: 98 % | SYSTOLIC BLOOD PRESSURE: 121 MMHG

## 2021-10-28 DIAGNOSIS — R19.00 ABDOMINAL MASS, UNSPECIFIED ABDOMINAL LOCATION: Primary | ICD-10-CM

## 2021-10-28 LAB
EKG ATRIAL RATE: 92 BPM
EKG DIAGNOSIS: NORMAL
EKG P AXIS: 40 DEGREES
EKG P-R INTERVAL: 152 MS
EKG Q-T INTERVAL: 362 MS
EKG QRS DURATION: 80 MS
EKG QTC CALCULATION (BAZETT): 447 MS
EKG R AXIS: 45 DEGREES
EKG T AXIS: 28 DEGREES
EKG VENTRICULAR RATE: 92 BPM

## 2021-10-28 PROCEDURE — 3700000001 HC ADD 15 MINUTES (ANESTHESIA): Performed by: SURGERY

## 2021-10-28 PROCEDURE — 88307 TISSUE EXAM BY PATHOLOGIST: CPT

## 2021-10-28 PROCEDURE — 2500000003 HC RX 250 WO HCPCS: Performed by: SURGERY

## 2021-10-28 PROCEDURE — 2580000003 HC RX 258: Performed by: SURGERY

## 2021-10-28 PROCEDURE — 93005 ELECTROCARDIOGRAM TRACING: CPT

## 2021-10-28 PROCEDURE — 6360000002 HC RX W HCPCS: Performed by: ANESTHESIOLOGY

## 2021-10-28 PROCEDURE — 7100000010 HC PHASE II RECOVERY - FIRST 15 MIN: Performed by: SURGERY

## 2021-10-28 PROCEDURE — 7100000011 HC PHASE II RECOVERY - ADDTL 15 MIN: Performed by: SURGERY

## 2021-10-28 PROCEDURE — 2580000003 HC RX 258: Performed by: ANESTHESIOLOGY

## 2021-10-28 PROCEDURE — 7100000001 HC PACU RECOVERY - ADDTL 15 MIN: Performed by: SURGERY

## 2021-10-28 PROCEDURE — 7100000000 HC PACU RECOVERY - FIRST 15 MIN: Performed by: SURGERY

## 2021-10-28 PROCEDURE — 3600000002 HC SURGERY LEVEL 2 BASE: Performed by: SURGERY

## 2021-10-28 PROCEDURE — 22901 EXC ABDL TUM DEEP 5 CM/>: CPT | Performed by: SURGERY

## 2021-10-28 PROCEDURE — 3700000000 HC ANESTHESIA ATTENDED CARE: Performed by: SURGERY

## 2021-10-28 PROCEDURE — 3600000012 HC SURGERY LEVEL 2 ADDTL 15MIN: Performed by: SURGERY

## 2021-10-28 PROCEDURE — 2709999900 HC NON-CHARGEABLE SUPPLY: Performed by: SURGERY

## 2021-10-28 PROCEDURE — 2500000003 HC RX 250 WO HCPCS: Performed by: ANESTHESIOLOGY

## 2021-10-28 PROCEDURE — 6370000000 HC RX 637 (ALT 250 FOR IP): Performed by: ANESTHESIOLOGY

## 2021-10-28 PROCEDURE — 2500000003 HC RX 250 WO HCPCS: Performed by: NURSE ANESTHETIST, CERTIFIED REGISTERED

## 2021-10-28 PROCEDURE — 6360000002 HC RX W HCPCS: Performed by: NURSE ANESTHETIST, CERTIFIED REGISTERED

## 2021-10-28 RX ORDER — OXYCODONE HYDROCHLORIDE AND ACETAMINOPHEN 5; 325 MG/1; MG/1
1 TABLET ORAL PRN
Status: COMPLETED | OUTPATIENT
Start: 2021-10-28 | End: 2021-10-28

## 2021-10-28 RX ORDER — KETOROLAC TROMETHAMINE 30 MG/ML
30 INJECTION, SOLUTION INTRAMUSCULAR; INTRAVENOUS ONCE
Status: COMPLETED | OUTPATIENT
Start: 2021-10-28 | End: 2021-10-28

## 2021-10-28 RX ORDER — OXYCODONE HYDROCHLORIDE AND ACETAMINOPHEN 5; 325 MG/1; MG/1
2 TABLET ORAL PRN
Status: COMPLETED | OUTPATIENT
Start: 2021-10-28 | End: 2021-10-28

## 2021-10-28 RX ORDER — MEPERIDINE HYDROCHLORIDE 25 MG/ML
12.5 INJECTION INTRAMUSCULAR; INTRAVENOUS; SUBCUTANEOUS EVERY 5 MIN PRN
Status: DISCONTINUED | OUTPATIENT
Start: 2021-10-28 | End: 2021-10-28 | Stop reason: HOSPADM

## 2021-10-28 RX ORDER — ONDANSETRON 2 MG/ML
4 INJECTION INTRAMUSCULAR; INTRAVENOUS
Status: COMPLETED | OUTPATIENT
Start: 2021-10-28 | End: 2021-10-28

## 2021-10-28 RX ORDER — SODIUM CHLORIDE 0.9 % (FLUSH) 0.9 %
10 SYRINGE (ML) INJECTION PRN
Status: DISCONTINUED | OUTPATIENT
Start: 2021-10-28 | End: 2021-10-28 | Stop reason: HOSPADM

## 2021-10-28 RX ORDER — MAGNESIUM HYDROXIDE 1200 MG/15ML
LIQUID ORAL CONTINUOUS PRN
Status: COMPLETED | OUTPATIENT
Start: 2021-10-28 | End: 2021-10-28

## 2021-10-28 RX ORDER — LIDOCAINE HYDROCHLORIDE 20 MG/ML
INJECTION, SOLUTION INFILTRATION; PERINEURAL PRN
Status: DISCONTINUED | OUTPATIENT
Start: 2021-10-28 | End: 2021-10-28 | Stop reason: SDUPTHER

## 2021-10-28 RX ORDER — FENTANYL CITRATE 50 UG/ML
INJECTION, SOLUTION INTRAMUSCULAR; INTRAVENOUS PRN
Status: DISCONTINUED | OUTPATIENT
Start: 2021-10-28 | End: 2021-10-28 | Stop reason: SDUPTHER

## 2021-10-28 RX ORDER — OXYCODONE HYDROCHLORIDE AND ACETAMINOPHEN 5; 325 MG/1; MG/1
1 TABLET ORAL EVERY 6 HOURS PRN
Qty: 28 TABLET | Refills: 0 | Status: SHIPPED | OUTPATIENT
Start: 2021-10-28 | End: 2021-11-04

## 2021-10-28 RX ORDER — BUPIVACAINE HYDROCHLORIDE 5 MG/ML
INJECTION, SOLUTION EPIDURAL; INTRACAUDAL PRN
Status: DISCONTINUED | OUTPATIENT
Start: 2021-10-28 | End: 2021-10-28 | Stop reason: ALTCHOICE

## 2021-10-28 RX ORDER — LIDOCAINE HYDROCHLORIDE 10 MG/ML
INJECTION, SOLUTION EPIDURAL; INFILTRATION; INTRACAUDAL; PERINEURAL PRN
Status: DISCONTINUED | OUTPATIENT
Start: 2021-10-28 | End: 2021-10-28 | Stop reason: ALTCHOICE

## 2021-10-28 RX ORDER — MIDAZOLAM HYDROCHLORIDE 1 MG/ML
INJECTION INTRAMUSCULAR; INTRAVENOUS PRN
Status: DISCONTINUED | OUTPATIENT
Start: 2021-10-28 | End: 2021-10-28 | Stop reason: SDUPTHER

## 2021-10-28 RX ORDER — SODIUM CHLORIDE, SODIUM LACTATE, POTASSIUM CHLORIDE, CALCIUM CHLORIDE 600; 310; 30; 20 MG/100ML; MG/100ML; MG/100ML; MG/100ML
INJECTION, SOLUTION INTRAVENOUS CONTINUOUS
Status: DISCONTINUED | OUTPATIENT
Start: 2021-10-28 | End: 2021-10-28 | Stop reason: HOSPADM

## 2021-10-28 RX ORDER — PROPOFOL 10 MG/ML
INJECTION, EMULSION INTRAVENOUS PRN
Status: DISCONTINUED | OUTPATIENT
Start: 2021-10-28 | End: 2021-10-28 | Stop reason: SDUPTHER

## 2021-10-28 RX ORDER — MORPHINE SULFATE 2 MG/ML
1 INJECTION, SOLUTION INTRAMUSCULAR; INTRAVENOUS EVERY 5 MIN PRN
Status: DISCONTINUED | OUTPATIENT
Start: 2021-10-28 | End: 2021-10-28 | Stop reason: HOSPADM

## 2021-10-28 RX ORDER — MORPHINE SULFATE 2 MG/ML
2 INJECTION, SOLUTION INTRAMUSCULAR; INTRAVENOUS EVERY 5 MIN PRN
Status: DISCONTINUED | OUTPATIENT
Start: 2021-10-28 | End: 2021-10-28 | Stop reason: HOSPADM

## 2021-10-28 RX ORDER — SODIUM CHLORIDE 0.9 % (FLUSH) 0.9 %
10 SYRINGE (ML) INJECTION EVERY 12 HOURS SCHEDULED
Status: DISCONTINUED | OUTPATIENT
Start: 2021-10-28 | End: 2021-10-28 | Stop reason: HOSPADM

## 2021-10-28 RX ORDER — SODIUM CHLORIDE 9 MG/ML
25 INJECTION, SOLUTION INTRAVENOUS PRN
Status: DISCONTINUED | OUTPATIENT
Start: 2021-10-28 | End: 2021-10-28 | Stop reason: HOSPADM

## 2021-10-28 RX ADMIN — LIDOCAINE HYDROCHLORIDE 60 MG: 20 INJECTION, SOLUTION INFILTRATION; PERINEURAL at 13:34

## 2021-10-28 RX ADMIN — PROPOFOL 60 MG: 10 INJECTION, EMULSION INTRAVENOUS at 13:43

## 2021-10-28 RX ADMIN — ONDANSETRON HYDROCHLORIDE 4 MG: 2 INJECTION, SOLUTION INTRAMUSCULAR; INTRAVENOUS at 14:24

## 2021-10-28 RX ADMIN — PROPOFOL 50 MG: 10 INJECTION, EMULSION INTRAVENOUS at 13:58

## 2021-10-28 RX ADMIN — MIDAZOLAM 2 MG: 1 INJECTION INTRAMUSCULAR; INTRAVENOUS at 13:27

## 2021-10-28 RX ADMIN — PROPOFOL 50 MG: 10 INJECTION, EMULSION INTRAVENOUS at 14:02

## 2021-10-28 RX ADMIN — PROPOFOL 80 MG: 10 INJECTION, EMULSION INTRAVENOUS at 13:34

## 2021-10-28 RX ADMIN — OXYCODONE HYDROCHLORIDE AND ACETAMINOPHEN 1 TABLET: 5; 325 TABLET ORAL at 15:03

## 2021-10-28 RX ADMIN — PROPOFOL 80 MG: 10 INJECTION, EMULSION INTRAVENOUS at 13:39

## 2021-10-28 RX ADMIN — PROPOFOL 60 MG: 10 INJECTION, EMULSION INTRAVENOUS at 13:47

## 2021-10-28 RX ADMIN — PROPOFOL 50 MG: 10 INJECTION, EMULSION INTRAVENOUS at 13:50

## 2021-10-28 RX ADMIN — HYDROMORPHONE HYDROCHLORIDE 0.5 MG: 1 INJECTION, SOLUTION INTRAMUSCULAR; INTRAVENOUS; SUBCUTANEOUS at 14:36

## 2021-10-28 RX ADMIN — KETOROLAC TROMETHAMINE 30 MG: 30 INJECTION, SOLUTION INTRAMUSCULAR at 14:36

## 2021-10-28 RX ADMIN — PROPOFOL 50 MG: 10 INJECTION, EMULSION INTRAVENOUS at 13:53

## 2021-10-28 RX ADMIN — FENTANYL CITRATE 50 MCG: 50 INJECTION INTRAMUSCULAR; INTRAVENOUS at 13:32

## 2021-10-28 RX ADMIN — SODIUM CHLORIDE, POTASSIUM CHLORIDE, SODIUM LACTATE AND CALCIUM CHLORIDE: 600; 310; 30; 20 INJECTION, SOLUTION INTRAVENOUS at 12:26

## 2021-10-28 RX ADMIN — HYDROMORPHONE HYDROCHLORIDE 0.5 MG: 1 INJECTION, SOLUTION INTRAMUSCULAR; INTRAVENOUS; SUBCUTANEOUS at 14:25

## 2021-10-28 RX ADMIN — FENTANYL CITRATE 50 MCG: 50 INJECTION INTRAMUSCULAR; INTRAVENOUS at 13:27

## 2021-10-28 RX ADMIN — FAMOTIDINE 20 MG: 10 INJECTION, SOLUTION INTRAVENOUS at 12:26

## 2021-10-28 ASSESSMENT — PAIN SCALES - GENERAL
PAINLEVEL_OUTOF10: 9
PAINLEVEL_OUTOF10: 6
PAINLEVEL_OUTOF10: 5
PAINLEVEL_OUTOF10: 10

## 2021-10-28 ASSESSMENT — PULMONARY FUNCTION TESTS
PIF_VALUE: 1
PIF_VALUE: 0
PIF_VALUE: 1
PIF_VALUE: 2
PIF_VALUE: 1
PIF_VALUE: 2
PIF_VALUE: 1
PIF_VALUE: 1
PIF_VALUE: 2
PIF_VALUE: 1
PIF_VALUE: 2
PIF_VALUE: 1
PIF_VALUE: 0
PIF_VALUE: 1
PIF_VALUE: 2
PIF_VALUE: 1

## 2021-10-28 ASSESSMENT — PAIN DESCRIPTION - DESCRIPTORS: DESCRIPTORS: BURNING

## 2021-10-28 ASSESSMENT — PAIN DESCRIPTION - PAIN TYPE
TYPE: SURGICAL PAIN

## 2021-10-28 ASSESSMENT — PAIN DESCRIPTION - LOCATION
LOCATION: ABDOMEN

## 2021-10-28 ASSESSMENT — PAIN - FUNCTIONAL ASSESSMENT: PAIN_FUNCTIONAL_ASSESSMENT: 0-10

## 2021-10-28 ASSESSMENT — LIFESTYLE VARIABLES: SMOKING_STATUS: 1

## 2021-10-28 ASSESSMENT — ENCOUNTER SYMPTOMS: SHORTNESS OF BREATH: 0

## 2021-10-28 NOTE — PROGRESS NOTES
IV removed. Discharge instructions gone over with pt and pt's son. Pt denies any questions or concerns. Pt in stable condition at this time. All personal belongings returned to pt.

## 2021-10-28 NOTE — ANESTHESIA PRE PROCEDURE
Department of Anesthesiology  Preprocedure Note       Name:  West Hauser   Age:  42 y.o.  :  1975                                          MRN:  [id number]         Date:  10/28/2021      Surgeon: Guillermo Andrews):  Owen Andrade MD    Procedure: Procedure(s):  EXCISION OF ABDOMINAL WALL MASS    Medications prior to admission:   Prior to Admission medications    Medication Sig Start Date End Date Taking? Authorizing Provider   lisinopril (PRINIVIL;ZESTRIL) 2.5 MG tablet Take 2.5 mg by mouth daily  21  Yes Historical Provider, MD   hydroCHLOROthiazide (MICROZIDE) 12.5 MG capsule Take 1 capsule by mouth daily 10/14/20  Yes Irvin Jason DO       Current medications:    Current Facility-Administered Medications   Medication Dose Route Frequency Provider Last Rate Last Admin    lactated ringers infusion   IntraVENous Continuous Demetrius Dunn MD        sodium chloride flush 0.9 % injection 10 mL  10 mL IntraVENous 2 times per day Demetrius Dunn MD        sodium chloride flush 0.9 % injection 10 mL  10 mL IntraVENous PRN Demetrius Dunn MD        0.9 % sodium chloride infusion  25 mL IntraVENous PRN Demetrius Dunn MD        famotidine (PEPCID) injection 20 mg  20 mg IntraVENous Once Demetrius Dunn MD           Allergies:     Allergies   Allergen Reactions    Penicillins        Problem List:    Patient Active Problem List   Diagnosis Code    Anemia D64.9    Anxiety F41.9    Hypertension I10    Bartholin's Gland Abscess--right N75.1    Gallstones K80.20    Chondromalacia of right knee M94.261       Past Medical History:        Diagnosis Date    Anemia     BORDERLINE--NO MEDS    Anxiety     Fibroid     Hormone disorder     Hypertension     USES HCTZ 12.5    Irregular uterine bleeding     resolved s/p hyst       Past Surgical History:        Procedure Laterality Date     SECTION   &     CHOLECYSTECTOMY, LAPAROSCOPIC N/A results found for: PREGTESTUR, PREGSERUM, HCG, HCGQUANT     ABGs: No results found for: PHART, PO2ART, CGW2YFN, OPC8WMU, BEART, R1SRPLGD     Type & Screen (If Applicable):  No results found for: LABABO, LABRH    Drug/Infectious Status (If Applicable):  No results found for: HIV, HEPCAB    COVID-19 Screening (If Applicable):   Lab Results   Component Value Date    COVID19 Not Detected 10/25/2021    COVID19 NOT DETECTED 10/26/2020           Anesthesia Evaluation  Patient summary reviewed no history of anesthetic complications:   Airway: Mallampati: II  TM distance: <3 FB   Neck ROM: full  Mouth opening: > = 3 FB Dental:    (+) upper dentures and lower dentures      Pulmonary: breath sounds clear to auscultation  (+) current smoker (1 PPD)    (-) COPD, asthma, shortness of breath, recent URI and sleep apnea          Patient smoked on day of surgery. Cardiovascular:    (+) hypertension:,     (-) pacemaker, past MI, CAD, CABG/stent, dysrhythmias,  angina and  CHF    ECG reviewed  Rhythm: regular  Rate: normal           Beta Blocker:  Not on Beta Blocker         Neuro/Psych:   (+) depression/anxiety  (ANX.)   (-) seizures, TIA and CVA           GI/Hepatic/Renal:   (+) morbid obesity     (-) GERD, liver disease, no renal disease and bowel prep       Endo/Other:    (+) : arthritis:., no malignancy/cancer. (-) diabetes mellitus, hypothyroidism, hyperthyroidism, blood dyscrasia, no malignancy/cancer               Abdominal:   (+) obese,     Abdomen: soft. Vascular: negative vascular ROS. Other Findings:             Anesthesia Plan      general     ASA 3       Induction: intravenous. MIPS: Postoperative opioids intended and Prophylactic antiemetics administered. Anesthetic plan and risks discussed with patient. Plan discussed with CRNA.               This pre-anesthesia assessment may be used as a history and physical.    DOS STAFF ADDENDUM:    Pt seen and examined, chart reviewed (including anesthesia, drug and allergy history). No interval changes to history and physical examination. Anesthetic plan, risks, benefits, alternatives, and personnel involved discussed with patient. Patient verbalized an understanding and agrees to proceed.       Syed Cheema MD  October 28, 2021  11:58 AM      Syed Cheema MD   10/28/2021

## 2021-10-28 NOTE — BRIEF OP NOTE
Brief Postoperative Note      Patient: Mirza Juarez  YOB: 1975  MRN: [id number]    Date of Procedure: 10/28/2021    Pre-Op Diagnosis: ENDOMETRIOMA    Post-Op Diagnosis: Same       Procedure(s):  EXCISION OF SUBFASCIAL ABDOMINAL WALL MASS    Surgeon(s):  Alanna Shepherd MD    Assistant:  Surgical Assistant: Rolando Chin    Anesthesia: General    Estimated Blood Loss (mL): less than 50     Complications: None    Specimens:   ID Type Source Tests Collected by Time Destination   A : SUBFASCIA ABDOMINAL WALL MASS [facility]OLOGY Alanna Shepherd MD 10/28/2021 1351        Implants:  * No implants in log *      Drains:   NG/OG/NJ/NE Tube Orogastric 18 fr Right mouth (Active)       Findings: as above    Electronically signed by Bridgette Ovalles MD on 10/28/2021 at 2:21 PM

## 2021-10-28 NOTE — H&P
[facility] SURGERY     CHIEF COMPLAINT: Abdominal mass     SUBJECTIVE:             Patient presents for evaluation of a mass in her lower abdomen. She reports she has had this for about 5 months. She states it is uncomfortable. She has a burning pain that radiates out from the area. It does not go away when she lays down. She denies nausea or vomiting.   Her bowels have been working normally.          Allergies   Allergen Reactions    Penicillins        Active Medications          Outpatient Medications Marked as Taking for the 21 encounter (Initial consult) with Burke Wiley MD   Medication Sig Dispense Refill    lisinopril (PRINIVIL;ZESTRIL) 2.5 MG tablet          VITAMIN D PO Take by mouth        hydroCHLOROthiazide (MICROZIDE) 12.5 MG capsule Take 1 capsule by mouth daily 30 capsule 1            Past Medical History        Past Medical History:   Diagnosis Date    Anemia       BORDERLINE--NO MEDS    Anxiety      Fibroid      Hormone disorder      Hypertension       USES HCTZ 12.5    Irregular uterine bleeding       resolved s/p hyst         Past Surgical History         Past Surgical History:   Procedure Laterality Date     SECTION    &     CHOLECYSTECTOMY, LAPAROSCOPIC N/A 10/30/2020     ROBOTIC CHOLECYSTECTOMY [facility] Burke Wiley MD [facility]   2010     [facility]    OTHER SURGICAL HISTORY   10/30/2020      ROBOTIC CHOLECYSTECTOMY (N/A Abdomen)          Family History         Family History   Problem Relation Age of Onset    Hypertension Mother      Stroke Mother      Cancer Sister           RECTAL    High Cholesterol Brother      Dementia Father           Social History               Socioeconomic History    Marital status:        Spouse name: Not on file    Number of children: Not on file    Years of education: Not on file    Highest education level: Not on file   Occupational History    Not on file   Tobacco Use  Smoking status: Current Every Day Smoker       Packs/day: 1.00       Years: 20.00       Pack years: 20.00       Types: Cigarettes       Start date: 2001    Smokeless tobacco: Never Used   Substance and Sexual Activity    Alcohol use: No    Drug use: No    Sexual activity: Not on file   Other Topics Concern    Not on file   Social History Narrative    Not on file      Social Determinants of Health          Financial Resource Strain:     Difficulty of Paying Living Expenses:    Food Insecurity:     Worried About Running Out of Food in the Last Year:     [address] in the Last Year:    Transportation Needs:     Lack of Transportation (Medical):  Lack of Transportation (Non-Medical):    Physical Activity:     Days of Exercise per Week:     Minutes of Exercise per Session:    Stress:     Feeling of Stress :    Social Connections:     Frequency of Communication with Friends and Family:     Frequency of Social Gatherings with Friends and Family:     Attends Jain Services:     Active Member of Clubs or Organizations:     Attends Club or Organization Meetings:     Marital Status:    Intimate Partner Violence:     Fear of Current or Ex-Partner:     Emotionally Abused:     Physically Abused:     Sexually Abused:               /85   Pulse 100   Temp 97.6 °F (36.4 °C) (Infrared)   Resp 20   Ht 5' 4\" (1.626 m)   Wt 253 lb (114.8 kg)   LMP 09/13/2009   SpO2 98%   BMI 43.43 kg/m²   .      ROS:  As per HPI, otherwise reviewed and negative     PHYSICAL EXAM:        Constitutional:  Well developed, well nourished, no acute distress, non-toxic appearance   Respiratory:  No respiratory distress, normal breath sounds, no rales, no wheezing   Cardiovascular:  Normal rate, normal rhythm, no murmurs.   GI: Bowel sounds positive, soft, there is a tender fullness in the right suprapubic area however, delineation is difficult due to her body habitus  Integument: Warm and dry  Neurologic: Alert & oriented x 3, normal motor function, normal sensory function, no focal deficits noted   Psychiatric:  Speech and behavior appropriate              DATA:  Radiology Review: Ultrasound shows a focal soft tissue nodule in this area. No corresponding finding was seen on the prior CT     ASSESSMENT:   1. Abdominal wall mass                        PLAN: CT scan will be obtained for more definitive evaluation. Further treatment plan will be based on the results of additional imaging.     Addendum: CT showed mass c/w endometrioma. Plan for excision. I explained the procedure including risks, benefits, and alternatives. Questions were answered and the patient agrees to proceed.

## 2021-10-28 NOTE — ANESTHESIA POSTPROCEDURE EVALUATION
Department of Anesthesiology  Postprocedure Note    Patient: Osvaldo Ramirez  MRN: [id number]  YOB: 1975  Date of evaluation: 10/28/2021  Time:  3:10 PM     Procedure Summary     Date: 10/28/21 Room / Location: [place] [address] / [facility]    Anesthesia Start: 5326 Anesthesia Stop: 8273    Procedure: EXCISION OF ABDOMINAL WALL MASS (N/A ) Diagnosis: (ENDOMETRIOMA)    Surgeons: Lisa Leung MD Responsible Provider: Adam Dow MD    Anesthesia Type: general ASA Status: 3          Anesthesia Type: general    Dasia Phase I: Dasia Score: 10    Dasia Phase II: Dasia Score: 10    Last vitals: Reviewed and per EMR flowsheets.      Vitals:    10/28/21 1420 10/28/21 1435 10/28/21 1450 10/28/21 1455   BP: 114/87 121/69 112/81 112/81   Pulse: 87 84 82 85   Resp: 16 11 17 17   Temp:    97.4 °F (36.3 °C)   TempSrc:       SpO2: 97% 96% 98% 95%   Weight:       Height:         Anesthesia Post Evaluation    Patient location during evaluation: bedside  Patient participation: complete - patient participated  Level of consciousness: awake and alert  Airway patency: patent  Nausea & Vomiting: no nausea  Complications: no  Cardiovascular status: hemodynamically stable  Respiratory status: acceptable  Hydration status: euvolemic

## 2021-10-29 ENCOUNTER — TELEPHONE (OUTPATIENT)
Dept: SURGERY | Age: 46
End: 2021-10-29

## 2021-10-29 NOTE — OP NOTE
[address]                 [address] [address]                                OPERATIVE REPORT    PATIENT NAME: Shanae Cisneros                    :        1975  MED REC NO:   [id number]                          ROOM:  ACCOUNT NO:   [de-identified]                           ADMIT DATE: 10/28/2021  PROVIDER:     Manuelita Sever. Shiff, MD    DATE OF PROCEDURE:  10/28/2021    PREOPERATIVE DIAGNOSIS:  Abdominal wall mass, likely endometrioma. POSTOPERATIVE DIAGNOSIS:  Abdominal wall mass, likely endometrioma in  the subfascial space. PROCEDURE:  Excision of subfascial abdominal wall mass. ANESTHESIA:  Local with MAC. SURGEON:  Julienne Cole MD    ESTIMATED BLOOD LOSS:  Less than 50 mL. INDICATIONS:  The patient is a 72-year-old woman who presented with a  painful abdominal mass. Imaging showed this to be in the subfascial  space along her incision, likely representing an endometrioma. OPERATIVE SUMMARY:  After preoperative evaluation, the patient was  brought in the operating suite and placed in a comfortable supine  position on the operating table. Monitoring equipment was attached and  she was given intravenous sedation per Anesthesia. Her abdomen was  sterilely prepped and draped and anesthetized with local anesthetic. A  wide elliptical incision was made over the mass and this was dissected  down to the subcutaneous tissues. However, the mass appeared to be  extending from the fascia deeply, and therefore, the fascia was excised  along with the mass. The mass measured 6 x 3 cm and was excised via a  3.5 x 10 cm fascial excision. Bleeding was controlled with  electrocautery and the fascia was closed with a running suture of #1  Prolene. Subcutaneous tissues were approximated with interrupted  sutures of 3-0 Vicryl and the skin was closed with a running  subcuticular suture of 4-0 Vicryl.   Benzoin, Steri-Strips, and dry  sterile dressings were applied. All sponge, needle, and instrument  counts were correct at the end of the case. The patient tolerated the  procedure well and was taken to the recovery area in stable condition.         Madelin Correa MD    D: 10/28/2021 14:39:23       T: 10/28/2021 14:43:05     [email or web address]  Job#: [id number]     Doc#: [id number]    CC:  ROSENDO Mahoney

## 2021-10-29 NOTE — TELEPHONE ENCOUNTER
----- Message from Owen Andrade MD sent at 10/29/2021 12:17 PM EDT -----  Please call with path.  Benign endometrial tissue, as suspected

## 2021-11-11 ENCOUNTER — OFFICE VISIT (OUTPATIENT)
Dept: SURGERY | Age: 46
End: 2021-11-11

## 2021-11-11 VITALS
DIASTOLIC BLOOD PRESSURE: 86 MMHG | WEIGHT: 250 LBS | BODY MASS INDEX: 42.68 KG/M2 | HEIGHT: 64 IN | SYSTOLIC BLOOD PRESSURE: 128 MMHG

## 2021-11-11 DIAGNOSIS — Z09 POSTOP CHECK: Primary | ICD-10-CM

## 2021-11-11 PROCEDURE — 99024 POSTOP FOLLOW-UP VISIT: CPT | Performed by: SURGERY

## 2021-11-11 NOTE — PROGRESS NOTES
[facility] GENERAL SURGERY      S:   Patient presents s/p excision of abdominal wall endometrioma. She reports doing well. Her discomfort is improving. The wound seems to be healing well. O:   Comfortable         Incision site healing well. No drainage              A:   S/P excision of abdominal wall endometrioma    P:   Follow up as needed.

## 2021-12-23 ENCOUNTER — OFFICE VISIT (OUTPATIENT)
Dept: SURGERY | Age: 46
End: 2021-12-23

## 2021-12-23 VITALS
HEIGHT: 64 IN | BODY MASS INDEX: 40.97 KG/M2 | SYSTOLIC BLOOD PRESSURE: 124 MMHG | WEIGHT: 240 LBS | DIASTOLIC BLOOD PRESSURE: 72 MMHG

## 2021-12-23 DIAGNOSIS — Z09 POSTOP CHECK: Primary | ICD-10-CM

## 2021-12-23 PROCEDURE — 99024 POSTOP FOLLOW-UP VISIT: CPT | Performed by: SURGERY

## 2021-12-23 NOTE — PROGRESS NOTES
[facility] GENERAL SURGERY      S:   Patient presents s/p excision of abdominal wall endometrioma. She reports some swelling. O:   Comfortable         Incision site healing well. There is a fullness underneath consistent with seroma              A:   S/P excision of abdominal endometrioma    P:   I reassured her that this likely just represents an endometrioma. It should resolve slowly over the next month or 2. Follow up as needed.

## 2023-01-19 ENCOUNTER — OFFICE VISIT (OUTPATIENT)
Dept: SURGERY | Age: 48
End: 2023-01-19

## 2023-01-19 VITALS
HEIGHT: 64 IN | SYSTOLIC BLOOD PRESSURE: 122 MMHG | WEIGHT: 219 LBS | BODY MASS INDEX: 37.39 KG/M2 | DIASTOLIC BLOOD PRESSURE: 74 MMHG

## 2023-01-19 DIAGNOSIS — K43.2 INCISIONAL HERNIA, WITHOUT OBSTRUCTION OR GANGRENE: Primary | ICD-10-CM
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
## 2023-01-23 ENCOUNTER — TELEPHONE (OUTPATIENT)
Dept: SURGERY | Age: 48
End: 2023-01-23

## 2023-01-23 NOTE — TELEPHONE ENCOUNTER
Spoke with Kirit Martin. ... she is scheduled on 02/15/2023.     ----- Message from Estela Hicks MD sent at 1/22/2023  7:12 AM EST -----  Thank you very much! Will get her in ASAP. Thanks again!   Marianela Trinoagnieszka  ----- Message -----  From: Rodrigo Friday, MD  Sent: 1/19/2023   2:02 PM EST  To: Estela Hicks MD

## 2023-02-15 ENCOUNTER — OFFICE VISIT (OUTPATIENT)
Dept: SURGERY | Age: 48
End: 2023-02-15
Payer: COMMERCIAL

## 2023-02-15 VITALS
WEIGHT: 215.2 LBS | DIASTOLIC BLOOD PRESSURE: 92 MMHG | SYSTOLIC BLOOD PRESSURE: 139 MMHG | HEIGHT: 64 IN | HEART RATE: 78 BPM | BODY MASS INDEX: 36.74 KG/M2 | TEMPERATURE: 98.4 F

## 2023-02-15 DIAGNOSIS — K43.2 INCISIONAL HERNIA, WITHOUT OBSTRUCTION OR GANGRENE: ICD-10-CM

## 2023-02-15 DIAGNOSIS — R19.00 ABDOMINAL MASS, UNSPECIFIED ABDOMINAL LOCATION: Primary | ICD-10-CM

## 2023-02-15 PROCEDURE — 3078F DIAST BP <80 MM HG: CPT | Performed by: SURGERY

## 2023-02-15 PROCEDURE — 3074F SYST BP LT 130 MM HG: CPT | Performed by: SURGERY

## 2023-02-15 PROCEDURE — 99204 OFFICE O/P NEW MOD 45 MIN: CPT | Performed by: SURGERY

## 2023-02-15 NOTE — PROGRESS NOTES
[address] [facility] [facility]    Consultation requested by: Christal De Jesus MD    CC: Recurrent abdominal wall hernia    HPI: 41 y.o. female with a PMHx as delineated below who presents in consultation for abdominal wall hernia. She underwent resection of an abdominal wall endometrioma (10/21) with Dr. Rosario Jack. She was doing heavy lifting several weeks later and felt a pain in the location concerning for a hernia. Given the location and comorbid condition, she was referred to the [address] [place] [address] at [facility].. She tolerating PO without any evidence of obstruction.     Max weight (lbs): 286  Lowest weight (lbs): 215  Current (lbs): 215  Weight change in the past 3 months: Yes (actively losing)  Max BMI: 41  Current BMI: 36.9  History of DVT/PE: No    Smoking: YES (ACTIVELY SMOKING)   Pregnancy / Miscarriage: 2 / 0    Past Medical History:   Diagnosis Date    Anemia     BORDERLINE--NO MEDS    Anxiety     Fibroid     Hormone disorder     Hypertension     USES HCTZ 12.5    Irregular uterine bleeding     resolved s/p hyst     Past Surgical History:   Procedure Laterality Date     SECTION   &     CHOLECYSTECTOMY, LAPAROSCOPIC N/A 10/30/2020    ROBOTIC CHOLECYSTECTOMY performed by Luis Alfredo Hernández MD at [address] [facility] ([address] [place] [address])  2010    [facility]    OTHER SURGICAL HISTORY  10/30/2020     ROBOTIC CHOLECYSTECTOMY (N/A Abdomen)     OTHER SURGICAL HISTORY  10/28/2021     EXCISION OF ABDOMINAL WALL MASS     SKIN BIOPSY N/A 10/28/2021    EXCISION OF ABDOMINAL WALL MASS performed by Luis Alfredo Hernández MD at [address] [place] [address] History     Socioeconomic History    Marital status:      Spouse name: Not on file    Number of children: Not on file    Years of education: Not on file    Highest education level: Not on file   Occupational History    Not on file   Tobacco Use    Smoking status: Every Day     Packs/day: 1.00     Years: 20.00     Pack years: 20.00 Types: Cigarettes     Start date: 2001    Smokeless tobacco: Never   Substance and Sexual Activity    Alcohol use: No    Drug use: No    Sexual activity: Not on file   Other Topics Concern    Not on file   Social History Narrative    Not on file     Social Determinants of Health     Financial Resource Strain: Not on file   Food Insecurity: Not on file   Transportation Needs: Not on file   Physical Activity: Not on file   Stress: Not on file   Social Connections: Not on file   Intimate Partner Violence: Not on file   Housing Stability: Not on file     Family History   Problem Relation Age of Onset    Hypertension Mother     Stroke Mother     Cancer Sister         RECTAL    High Cholesterol Brother     Dementia Father        Allergy:  Allergies   Allergen Reactions    Penicillins        ROS   Constitutional: Negative for chills and fever. HENT: Negative for congestion, facial swelling, and voice change. Eyes: Negative for photophobia and visual disturbance. Respiratory: Negative for apnea, cough, chest tightness and shortness of breath. Cardiovascular: Negative for chest pain and palpitations. Gastrointestinal: Negative for dysphagia and early satiety. Genitourinary: Negative for difficulty urinating, dysuria, flank pain, frequency and hematuria. Musculoskeletal: Negative for new gait problem, joint swelling and myalgias. Skin: Negative for color change, pallor and rash. Endocrine: negative for tremors, temperature intolerance or polydipsia. Allergic/Immunologic: Negative for new environmental or food allergies. Neurological: Negative for dizziness, seizures, speech difficulty, numbness. Hematological: Negative for adenopathy. Psychiatric/Behavioral: Negative for agitation and confusion.     EXAM    BP (!) 139/92 (Site: Left Upper Arm, Position: Sitting)   Pulse 78   Temp 98.4 °F (36.9 °C) (Temporal)   Ht 5' 4\" (1.626 m)   Wt 215 lb 3.2 oz (97.6 kg)   LMP 09/13/2009   BMI 36.94 kg/m² GEN: NAD, pleasant, obese  CVS: RRR  PULM: No respiratory distress  HEENT: PERRLA/EOMI; hearing appears within normal limits  NECK: Supple with trachea in midline, no masses  EXT: Ambulating without lymphedema or need for assistance  ABD: soft/some mild TTP in the lower abdomen / obese  Palpable hernia defect  NEURO: No focal deficits, no obvious CN deficits    IMP:47 y.o. female with incisional hernia  PLAN: Will obtain CT imaging for delineation of size of the defect. Will then work with the patient to decrease weight (BMI goal 30) and plan for repair of defect (with mesh and possible component separation) as well as concomitant abdominoplasty. We had an extensive discussion on the importance of complete nicotine abstinence. Will follow-up in 3 months to monitor progress. Once all of the above have been obtained, will schedule. The pathology and recurrent nature of abdominal wall hernias after multiple repairs were discussed with the patient. she was counseled on medical optimization (nutrition, exercise, local wound care, abstaining from nicotine, etc.) as well as surgical wound optimization (e.g. debridement(s), mesh excision, etc). Clinical photos were obtained. The patient was also advised that the procedure will likely require the extensive lysis of peritoneal adhesions. Additional conversation regarding reconstructive options - including possible scar revision, adjacent tissue transfer, panniculectomy, and possible staged free fascio/myocutaneous reconstruction +/- creation of AV loop was carried out with the patient. The postoperative care instructions were also elucidated in detail, with the patient receiving a booklet detailing the anticipated care routine. The patient was advised that their hospital stay would likely be several days and can be in excess of 7 days.  \"A significant amount of time was also allocated to nicotine's effect on wound healing and the patient understands that a sub-optimal wound healing and cosmetic result may occur with continued utilization\". The risks, benefits, alternatives, outcomes, personnel involved were discussed and all questions were answered in a satisfactory manner according to the patient. Specifically, the risks including, but not limited to: bleeding possibly requiring transfusion or reoperation, infection, seroma, nonhealing, recurrence necessitating reoperation, poor cosmetic outcome, loss of umbilicus, loss of abdominal wall rigidity, bulge without hernia, lateral hernia, bowel or bladder injury, possible stoma, scarring, VTE (DVT/PE), and death were discussed.      Bruno Becker MD  [facility]  02/15/23

## 2023-03-14 ENCOUNTER — HOSPITAL ENCOUNTER (OUTPATIENT)
Dept: CT IMAGING | Age: 48
Discharge: HOME | End: 2023-03-14
Payer: COMMERCIAL

## 2023-03-14 DIAGNOSIS — K43.2 INCISIONAL HERNIA, WITHOUT OBSTRUCTION OR GANGRENE: ICD-10-CM

## 2023-03-14 PROCEDURE — 6360000004 HC RX CONTRAST MEDICATION: Performed by: SURGERY

## 2023-03-14 PROCEDURE — 74177 CT ABD & PELVIS W/CONTRAST: CPT

## 2023-03-14 RX ADMIN — IOPAMIDOL 75 ML: 755 INJECTION, SOLUTION INTRAVENOUS at 17:06

## 2023-03-14 RX ADMIN — DIATRIZOATE MEGLUMINE AND DIATRIZOATE SODIUM 12 ML: 660; 100 LIQUID ORAL; RECTAL at 17:05

## 2023-03-15 ENCOUNTER — TELEPHONE (OUTPATIENT)
Dept: SURGERY | Age: 48
End: 2023-03-15

## 2023-03-15 NOTE — TELEPHONE ENCOUNTER
Called patient and discussed MD results as noted below. Patient scheduled for 3 month follow up in may.  DONE

## 2023-03-15 NOTE — TELEPHONE ENCOUNTER
----- Message from Ryan Osorio MD sent at 3/15/2023  9:03 AM EDT -----  Hernia that will definitely be amenable to reconstruction. However, needs to work toward optimization as previously outlined (weight decrease and nicotine abstinence). Thanks!   NK

## 2023-05-16 NOTE — PROGRESS NOTES
[address] [facility],[address]    Consultation requested by: Daniel Huitron MD    CC: Recurrent abdominal wall hernia    HPI: 41 y.o. female with a PMHx as delineated below who presents in consultation for abdominal wall hernia. She underwent resection of an abdominal wall endometrioma (10/21) with Dr. Mabel Yang. She was doing heavy lifting several weeks later and felt a pain in the location concerning for a hernia. Given the location and comorbid condition, she was referred to the [address] [place] [address] at [facility].. She tolerating PO without any evidence of obstruction. Since her last evaluation, she has lost a total of 26 lbs. She notes that she has been having issues with constipation, but otherwise has been ok.     Max weight (lbs): 286  Lowest weight (lbs): 215  Current (lbs): 189 (215)  Weight change in the past 3 months: Yes (actively losing)  Max BMI: 41  Current BMI: 32.6 (36.9)  History of DVT/PE: No    Smoking: YES (ACTIVELY SMOKING)   Pregnancy / Miscarriage: 2 / 0    Past Medical History:   Diagnosis Date    Anemia     BORDERLINE--NO MEDS    Anxiety     Fibroid     Hormone disorder     Hypertension     USES HCTZ 12.5    Irregular uterine bleeding     resolved s/p hyst     Past Surgical History:   Procedure Laterality Date     SECTION   &     CHOLECYSTECTOMY, LAPAROSCOPIC N/A 10/30/2020    ROBOTIC CHOLECYSTECTOMY performed by Suraj Vaughn MD at [address] [facility] ([address] [place] [address])  2010    [facility]    OTHER SURGICAL HISTORY  10/30/2020     ROBOTIC CHOLECYSTECTOMY (N/A Abdomen)     OTHER SURGICAL HISTORY  10/28/2021     EXCISION OF ABDOMINAL WALL MASS     SKIN BIOPSY N/A 10/28/2021    EXCISION OF ABDOMINAL WALL MASS performed by Suraj Vaughn MD at [facility] History    Marital status:      Spouse name: Not on file    Number of children: Not on file    Years of education: Not on file    Highest education

## 2023-05-17 ENCOUNTER — OFFICE VISIT (OUTPATIENT)
Dept: SURGERY | Age: 48
End: 2023-05-17
Payer: COMMERCIAL

## 2023-05-17 VITALS
HEIGHT: 64 IN | RESPIRATION RATE: 15 BRPM | HEART RATE: 98 BPM | DIASTOLIC BLOOD PRESSURE: 93 MMHG | OXYGEN SATURATION: 98 % | BODY MASS INDEX: 32.4 KG/M2 | WEIGHT: 189.8 LBS | SYSTOLIC BLOOD PRESSURE: 178 MMHG | TEMPERATURE: 98 F

## 2023-05-17 DIAGNOSIS — Z72.0 NICOTINE ABUSE: Primary | ICD-10-CM

## 2023-05-17 DIAGNOSIS — K43.2 INCISIONAL HERNIA, WITHOUT OBSTRUCTION OR GANGRENE: ICD-10-CM

## 2023-05-17 DIAGNOSIS — R19.00 ABDOMINAL MASS, UNSPECIFIED ABDOMINAL LOCATION: ICD-10-CM

## 2023-05-17 PROCEDURE — 3077F SYST BP >= 140 MM HG: CPT | Performed by: SURGERY

## 2023-05-17 PROCEDURE — 99213 OFFICE O/P EST LOW 20 MIN: CPT | Performed by: SURGERY

## 2023-05-17 PROCEDURE — 3080F DIAST BP >= 90 MM HG: CPT | Performed by: SURGERY

## 2023-06-02 ENCOUNTER — HOSPITAL ENCOUNTER (OUTPATIENT)
Age: 48
Discharge: HOME | End: 2023-06-02
Payer: COMMERCIAL

## 2023-06-02 DIAGNOSIS — Z72.0 NICOTINE ABUSE: ICD-10-CM

## 2023-06-02 PROCEDURE — G0480 DRUG TEST DEF 1-7 CLASSES: HCPCS

## 2023-06-06 ENCOUNTER — TELEPHONE (OUTPATIENT)
Dept: SURGERY | Age: 48
End: 2023-06-06

## 2023-06-06 LAB
COTININE SERPL-MCNC: <5 NG/ML
NICOTINE SERPL-MCNC: <5 NG/ML

## 2023-06-06 NOTE — TELEPHONE ENCOUNTER
----- Message from Cyn Victor MD sent at 6/6/2023  7:20 AM EDT -----  Negative nicotine and cotinine tests - good to schedule. Thanks!   NK

## 2023-06-14 ENCOUNTER — INITIAL CONSULT (OUTPATIENT)
Dept: BARIATRICS/WEIGHT MGMT | Age: 48
End: 2023-06-14
Payer: COMMERCIAL

## 2023-06-14 VITALS
HEART RATE: 98 BPM | HEIGHT: 64 IN | BODY MASS INDEX: 33.29 KG/M2 | DIASTOLIC BLOOD PRESSURE: 92 MMHG | SYSTOLIC BLOOD PRESSURE: 133 MMHG | WEIGHT: 195 LBS

## 2023-06-14 DIAGNOSIS — E66.9 OBESITY (BMI 30.0-34.9): ICD-10-CM

## 2023-06-14 DIAGNOSIS — K43.0 INCARCERATED INCISIONAL HERNIA: Primary | ICD-10-CM

## 2023-06-14 PROCEDURE — 3078F DIAST BP <80 MM HG: CPT | Performed by: SURGERY

## 2023-06-14 PROCEDURE — 3074F SYST BP LT 130 MM HG: CPT | Performed by: SURGERY

## 2023-06-14 PROCEDURE — 99204 OFFICE O/P NEW MOD 45 MIN: CPT | Performed by: SURGERY

## 2023-06-21 RX ORDER — ESTRADIOL 0.05 MG/D
PATCH TRANSDERMAL
COMMUNITY

## 2023-06-21 RX ORDER — ERGOCALCIFEROL 1.25 MG/1
1 CAPSULE ORAL
COMMUNITY
Start: 2022-02-18 | End: 2023-06-21 | Stop reason: ALTCHOICE

## 2023-06-21 RX ORDER — PHENTERMINE HYDROCHLORIDE 37.5 MG/1
1 CAPSULE ORAL
COMMUNITY
End: 2023-06-21 | Stop reason: ALTCHOICE

## 2023-06-22 ENCOUNTER — TELEPHONE (OUTPATIENT)
Dept: BARIATRICS/WEIGHT MGMT | Age: 48
End: 2023-06-22

## 2023-06-23 ENCOUNTER — ANESTHESIA EVENT (OUTPATIENT)
Dept: OPERATING ROOM | Age: 48
End: 2023-06-23
Payer: COMMERCIAL

## 2023-06-23 ENCOUNTER — ANESTHESIA (OUTPATIENT)
Dept: OPERATING ROOM | Age: 48
End: 2023-06-23
Payer: COMMERCIAL

## 2023-06-23 ENCOUNTER — HOSPITAL ENCOUNTER (INPATIENT)
Age: 48
LOS: 3 days | Discharge: HOME | End: 2023-06-26
Attending: SURGERY | Admitting: SURGERY
Payer: COMMERCIAL

## 2023-06-23 DIAGNOSIS — G89.18 POST-OP PAIN: Primary | ICD-10-CM

## 2023-06-23 DIAGNOSIS — K43.2 INCISIONAL HERNIA, WITHOUT OBSTRUCTION OR GANGRENE: ICD-10-CM

## 2023-06-23 PROCEDURE — 6360000002 HC RX W HCPCS: Performed by: STUDENT IN AN ORGANIZED HEALTH CARE EDUCATION/TRAINING PROGRAM

## 2023-06-23 PROCEDURE — C9290 INJ, BUPIVACAINE LIPOSOME: HCPCS | Performed by: ANESTHESIOLOGY

## 2023-06-23 PROCEDURE — 6360000002 HC RX W HCPCS: Performed by: SURGERY

## 2023-06-23 PROCEDURE — 15734 MUSCLE-SKIN GRAFT TRUNK: CPT | Performed by: SURGERY

## 2023-06-23 PROCEDURE — 88302 TISSUE EXAM BY PATHOLOGIST: CPT

## 2023-06-23 PROCEDURE — C1729 CATH, DRAINAGE: HCPCS | Performed by: SURGERY

## 2023-06-23 PROCEDURE — 2580000003 HC RX 258: Performed by: SURGERY

## 2023-06-23 PROCEDURE — 7100000000 HC PACU RECOVERY - FIRST 15 MIN: Performed by: SURGERY

## 2023-06-23 PROCEDURE — 3700000001 HC ADD 15 MINUTES (ANESTHESIA): Performed by: SURGERY

## 2023-06-23 PROCEDURE — 3700000000 HC ANESTHESIA ATTENDED CARE: Performed by: SURGERY

## 2023-06-23 PROCEDURE — 2580000003 HC RX 258: Performed by: STUDENT IN AN ORGANIZED HEALTH CARE EDUCATION/TRAINING PROGRAM

## 2023-06-23 PROCEDURE — 0YQ50ZZ REPAIR RIGHT INGUINAL REGION, OPEN APPROACH: ICD-10-PCS | Performed by: SURGERY

## 2023-06-23 PROCEDURE — 2500000003 HC RX 250 WO HCPCS: Performed by: ANESTHESIOLOGY

## 2023-06-23 PROCEDURE — 0KNL0ZZ RELEASE LEFT ABDOMEN MUSCLE, OPEN APPROACH: ICD-10-PCS | Performed by: SURGERY

## 2023-06-23 PROCEDURE — 0KNK0ZZ RELEASE RIGHT ABDOMEN MUSCLE, OPEN APPROACH: ICD-10-PCS | Performed by: SURGERY

## 2023-06-23 PROCEDURE — 0WUF0JZ SUPPLEMENT ABDOMINAL WALL WITH SYNTHETIC SUBSTITUTE, OPEN APPROACH: ICD-10-PCS | Performed by: SURGERY

## 2023-06-23 PROCEDURE — 49596 RPR AA HRN 1ST > 10 NCR/STRN: CPT | Performed by: SURGERY

## 2023-06-23 PROCEDURE — 2720000010 HC SURG SUPPLY STERILE: Performed by: SURGERY

## 2023-06-23 PROCEDURE — 2580000003 HC RX 258: Performed by: FAMILY MEDICINE

## 2023-06-23 PROCEDURE — 2500000003 HC RX 250 WO HCPCS: Performed by: NURSE ANESTHETIST, CERTIFIED REGISTERED

## 2023-06-23 PROCEDURE — 2500000003 HC RX 250 WO HCPCS

## 2023-06-23 PROCEDURE — C1713 ANCHOR/SCREW BN/BN,TIS/BN: HCPCS | Performed by: SURGERY

## 2023-06-23 PROCEDURE — 2709999900 HC NON-CHARGEABLE SUPPLY: Performed by: SURGERY

## 2023-06-23 PROCEDURE — MISCABD ABDOMINOPLAST-RESECTION, PLCTN, REPSTN, LIPPO: Performed by: SURGERY

## 2023-06-23 PROCEDURE — 6360000002 HC RX W HCPCS: Performed by: ANESTHESIOLOGY

## 2023-06-23 PROCEDURE — 3600000014 HC SURGERY LEVEL 4 ADDTL 15MIN: Performed by: SURGERY

## 2023-06-23 PROCEDURE — 2500000003 HC RX 250 WO HCPCS: Performed by: SURGERY

## 2023-06-23 PROCEDURE — 6360000002 HC RX W HCPCS: Performed by: FAMILY MEDICINE

## 2023-06-23 PROCEDURE — 0WQF0ZZ REPAIR ABDOMINAL WALL, OPEN APPROACH: ICD-10-PCS | Performed by: SURGERY

## 2023-06-23 PROCEDURE — 7100000001 HC PACU RECOVERY - ADDTL 15 MIN: Performed by: SURGERY

## 2023-06-23 PROCEDURE — 0JB80ZZ EXCISION OF ABDOMEN SUBCUTANEOUS TISSUE AND FASCIA, OPEN APPROACH: ICD-10-PCS | Performed by: SURGERY

## 2023-06-23 PROCEDURE — 3E0T3BZ INTRODUCTION OF ANESTHETIC AGENT INTO PERIPHERAL NERVES AND PLEXI, PERCUTANEOUS APPROACH: ICD-10-PCS | Performed by: SURGERY

## 2023-06-23 PROCEDURE — A4217 STERILE WATER/SALINE, 500 ML: HCPCS | Performed by: SURGERY

## 2023-06-23 PROCEDURE — 0JX80ZC TRANSFER ABDOMEN SUBCUTANEOUS TISSUE AND FASCIA WITH SKIN, SUBCUTANEOUS TISSUE AND FASCIA, OPEN APPROACH: ICD-10-PCS | Performed by: SURGERY

## 2023-06-23 PROCEDURE — 88305 TISSUE EXAM BY PATHOLOGIST: CPT

## 2023-06-23 PROCEDURE — 6360000002 HC RX W HCPCS: Performed by: NURSE ANESTHETIST, CERTIFIED REGISTERED

## 2023-06-23 PROCEDURE — 3600000004 HC SURGERY LEVEL 4 BASE: Performed by: SURGERY

## 2023-06-23 PROCEDURE — 2580000003 HC RX 258: Performed by: NURSE ANESTHETIST, CERTIFIED REGISTERED

## 2023-06-23 PROCEDURE — 1200000000 HC SEMI PRIVATE

## 2023-06-23 PROCEDURE — 64488 TAP BLOCK BI INJECTION: CPT | Performed by: FAMILY MEDICINE

## 2023-06-23 PROCEDURE — C1781 MESH (IMPLANTABLE): HCPCS | Performed by: SURGERY

## 2023-06-23 DEVICE — MESH SURG W6XL8IN RECT FULL REABSORBABLE FOR SFT TISS RECON: Type: IMPLANTABLE DEVICE | Status: FUNCTIONAL

## 2023-06-23 RX ORDER — GLYCOPYRROLATE 0.2 MG/ML
INJECTION INTRAMUSCULAR; INTRAVENOUS PRN
Status: DISCONTINUED | OUTPATIENT
Start: 2023-06-23 | End: 2023-06-23 | Stop reason: SDUPTHER

## 2023-06-23 RX ORDER — ONDANSETRON 4 MG/1
4 TABLET, ORALLY DISINTEGRATING ORAL EVERY 8 HOURS PRN
Status: DISCONTINUED | OUTPATIENT
Start: 2023-06-23 | End: 2023-06-26 | Stop reason: HOSPADM

## 2023-06-23 RX ORDER — SODIUM CHLORIDE 0.9 % (FLUSH) 0.9 %
5-40 SYRINGE (ML) INJECTION EVERY 12 HOURS SCHEDULED
Status: DISCONTINUED | OUTPATIENT
Start: 2023-06-23 | End: 2023-06-23 | Stop reason: HOSPADM

## 2023-06-23 RX ORDER — ROCURONIUM BROMIDE 10 MG/ML
INJECTION, SOLUTION INTRAVENOUS PRN
Status: DISCONTINUED | OUTPATIENT
Start: 2023-06-23 | End: 2023-06-23 | Stop reason: SDUPTHER

## 2023-06-23 RX ORDER — HEPARIN SODIUM 5000 [USP'U]/ML
5000 INJECTION, SOLUTION INTRAVENOUS; SUBCUTANEOUS ONCE
Status: COMPLETED | OUTPATIENT
Start: 2023-06-23 | End: 2023-06-23

## 2023-06-23 RX ORDER — ONDANSETRON 2 MG/ML
INJECTION INTRAMUSCULAR; INTRAVENOUS PRN
Status: DISCONTINUED | OUTPATIENT
Start: 2023-06-23 | End: 2023-06-23 | Stop reason: SDUPTHER

## 2023-06-23 RX ORDER — LIDOCAINE HYDROCHLORIDE 20 MG/ML
INJECTION, SOLUTION INTRAVENOUS PRN
Status: DISCONTINUED | OUTPATIENT
Start: 2023-06-23 | End: 2023-06-23 | Stop reason: SDUPTHER

## 2023-06-23 RX ORDER — SODIUM CHLORIDE 0.9 % (FLUSH) 0.9 %
10 SYRINGE (ML) INJECTION PRN
Status: DISCONTINUED | OUTPATIENT
Start: 2023-06-23 | End: 2023-06-26 | Stop reason: HOSPADM

## 2023-06-23 RX ORDER — FENTANYL CITRATE 50 UG/ML
INJECTION, SOLUTION INTRAMUSCULAR; INTRAVENOUS PRN
Status: DISCONTINUED | OUTPATIENT
Start: 2023-06-23 | End: 2023-06-23 | Stop reason: SDUPTHER

## 2023-06-23 RX ORDER — SODIUM CHLORIDE 0.9 % (FLUSH) 0.9 %
10 SYRINGE (ML) INJECTION EVERY 12 HOURS SCHEDULED
Status: DISCONTINUED | OUTPATIENT
Start: 2023-06-23 | End: 2023-06-26 | Stop reason: HOSPADM

## 2023-06-23 RX ORDER — FENTANYL CITRATE 50 UG/ML
25 INJECTION, SOLUTION INTRAMUSCULAR; INTRAVENOUS EVERY 5 MIN PRN
Status: DISCONTINUED | OUTPATIENT
Start: 2023-06-23 | End: 2023-06-23 | Stop reason: HOSPADM

## 2023-06-23 RX ORDER — ACETAMINOPHEN 325 MG/1
650 TABLET ORAL
Status: DISCONTINUED | OUTPATIENT
Start: 2023-06-23 | End: 2023-06-23 | Stop reason: HOSPADM

## 2023-06-23 RX ORDER — BUPIVACAINE HYDROCHLORIDE 2.5 MG/ML
INJECTION, SOLUTION EPIDURAL; INFILTRATION; INTRACAUDAL
Status: COMPLETED | OUTPATIENT
Start: 2023-06-23 | End: 2023-06-23

## 2023-06-23 RX ORDER — IPRATROPIUM BROMIDE AND ALBUTEROL SULFATE 2.5; .5 MG/3ML; MG/3ML
1 SOLUTION RESPIRATORY (INHALATION)
Status: DISCONTINUED | OUTPATIENT
Start: 2023-06-23 | End: 2023-06-23 | Stop reason: HOSPADM

## 2023-06-23 RX ORDER — LORAZEPAM 2 MG/ML
0.5 INJECTION INTRAMUSCULAR
Status: DISCONTINUED | OUTPATIENT
Start: 2023-06-23 | End: 2023-06-23 | Stop reason: HOSPADM

## 2023-06-23 RX ORDER — EPHEDRINE SULFATE 50 MG/ML
INJECTION INTRAVENOUS PRN
Status: DISCONTINUED | OUTPATIENT
Start: 2023-06-23 | End: 2023-06-23 | Stop reason: SDUPTHER

## 2023-06-23 RX ORDER — HYDROMORPHONE HYDROCHLORIDE 1 MG/ML
0.5 INJECTION, SOLUTION INTRAMUSCULAR; INTRAVENOUS; SUBCUTANEOUS EVERY 5 MIN PRN
Status: DISCONTINUED | OUTPATIENT
Start: 2023-06-23 | End: 2023-06-23 | Stop reason: HOSPADM

## 2023-06-23 RX ORDER — SODIUM CHLORIDE 9 MG/ML
INJECTION, SOLUTION INTRAVENOUS PRN
Status: DISCONTINUED | OUTPATIENT
Start: 2023-06-23 | End: 2023-06-23 | Stop reason: HOSPADM

## 2023-06-23 RX ORDER — MIDAZOLAM HYDROCHLORIDE 1 MG/ML
INJECTION INTRAMUSCULAR; INTRAVENOUS PRN
Status: DISCONTINUED | OUTPATIENT
Start: 2023-06-23 | End: 2023-06-23 | Stop reason: SDUPTHER

## 2023-06-23 RX ORDER — SODIUM CHLORIDE, SODIUM LACTATE, POTASSIUM CHLORIDE, CALCIUM CHLORIDE 600; 310; 30; 20 MG/100ML; MG/100ML; MG/100ML; MG/100ML
INJECTION, SOLUTION INTRAVENOUS CONTINUOUS
Status: DISCONTINUED | OUTPATIENT
Start: 2023-06-23 | End: 2023-06-24

## 2023-06-23 RX ORDER — ONDANSETRON 2 MG/ML
4 INJECTION INTRAMUSCULAR; INTRAVENOUS EVERY 6 HOURS PRN
Status: DISCONTINUED | OUTPATIENT
Start: 2023-06-23 | End: 2023-06-26 | Stop reason: HOSPADM

## 2023-06-23 RX ORDER — MEPERIDINE HYDROCHLORIDE 25 MG/ML
12.5 INJECTION INTRAMUSCULAR; INTRAVENOUS; SUBCUTANEOUS EVERY 5 MIN PRN
Status: DISCONTINUED | OUTPATIENT
Start: 2023-06-23 | End: 2023-06-23 | Stop reason: HOSPADM

## 2023-06-23 RX ORDER — SODIUM CHLORIDE, SODIUM LACTATE, POTASSIUM CHLORIDE, CALCIUM CHLORIDE 600; 310; 30; 20 MG/100ML; MG/100ML; MG/100ML; MG/100ML
INJECTION, SOLUTION INTRAVENOUS CONTINUOUS
Status: DISCONTINUED | OUTPATIENT
Start: 2023-06-23 | End: 2023-06-23 | Stop reason: HOSPADM

## 2023-06-23 RX ORDER — DIPHENHYDRAMINE HYDROCHLORIDE 50 MG/ML
12.5 INJECTION INTRAMUSCULAR; INTRAVENOUS
Status: DISCONTINUED | OUTPATIENT
Start: 2023-06-23 | End: 2023-06-23 | Stop reason: HOSPADM

## 2023-06-23 RX ORDER — HYDROMORPHONE HCL 110MG/55ML
PATIENT CONTROLLED ANALGESIA SYRINGE INTRAVENOUS PRN
Status: DISCONTINUED | OUTPATIENT
Start: 2023-06-23 | End: 2023-06-23 | Stop reason: SDUPTHER

## 2023-06-23 RX ORDER — SODIUM CHLORIDE 0.9 % (FLUSH) 0.9 %
5-40 SYRINGE (ML) INJECTION PRN
Status: DISCONTINUED | OUTPATIENT
Start: 2023-06-23 | End: 2023-06-23 | Stop reason: HOSPADM

## 2023-06-23 RX ORDER — KETAMINE HCL IN NACL, ISO-OSM 20 MG/2 ML
SYRINGE (ML) INJECTION PRN
Status: DISCONTINUED | OUTPATIENT
Start: 2023-06-23 | End: 2023-06-23 | Stop reason: SDUPTHER

## 2023-06-23 RX ORDER — SUCCINYLCHOLINE/SOD CL,ISO/PF 200MG/10ML
SYRINGE (ML) INTRAVENOUS PRN
Status: DISCONTINUED | OUTPATIENT
Start: 2023-06-23 | End: 2023-06-23 | Stop reason: SDUPTHER

## 2023-06-23 RX ORDER — POLYETHYLENE GLYCOL 3350 17 G/17G
17 POWDER, FOR SOLUTION ORAL DAILY PRN
Status: DISCONTINUED | OUTPATIENT
Start: 2023-06-23 | End: 2023-06-24

## 2023-06-23 RX ORDER — CLINDAMYCIN PHOSPHATE 600 MG/50ML
600 INJECTION INTRAVENOUS EVERY 8 HOURS
Status: DISCONTINUED | OUTPATIENT
Start: 2023-06-23 | End: 2023-06-26 | Stop reason: HOSPADM

## 2023-06-23 RX ORDER — SODIUM CHLORIDE 9 MG/ML
INJECTION, SOLUTION INTRAVENOUS PRN
Status: DISCONTINUED | OUTPATIENT
Start: 2023-06-23 | End: 2023-06-26 | Stop reason: HOSPADM

## 2023-06-23 RX ORDER — NALOXONE HYDROCHLORIDE 0.4 MG/ML
INJECTION, SOLUTION INTRAMUSCULAR; INTRAVENOUS; SUBCUTANEOUS PRN
Status: DISCONTINUED | OUTPATIENT
Start: 2023-06-23 | End: 2023-06-24

## 2023-06-23 RX ORDER — PROPOFOL 10 MG/ML
INJECTION, EMULSION INTRAVENOUS PRN
Status: DISCONTINUED | OUTPATIENT
Start: 2023-06-23 | End: 2023-06-23 | Stop reason: SDUPTHER

## 2023-06-23 RX ORDER — LABETALOL HYDROCHLORIDE 5 MG/ML
10 INJECTION, SOLUTION INTRAVENOUS
Status: DISCONTINUED | OUTPATIENT
Start: 2023-06-23 | End: 2023-06-23 | Stop reason: HOSPADM

## 2023-06-23 RX ORDER — PROCHLORPERAZINE EDISYLATE 5 MG/ML
5 INJECTION INTRAMUSCULAR; INTRAVENOUS
Status: DISCONTINUED | OUTPATIENT
Start: 2023-06-23 | End: 2023-06-23 | Stop reason: HOSPADM

## 2023-06-23 RX ORDER — ONDANSETRON 2 MG/ML
4 INJECTION INTRAMUSCULAR; INTRAVENOUS
Status: DISCONTINUED | OUTPATIENT
Start: 2023-06-23 | End: 2023-06-23 | Stop reason: HOSPADM

## 2023-06-23 RX ADMIN — CLINDAMYCIN IN 5 PERCENT DEXTROSE 600 MG: 12 INJECTION, SOLUTION INTRAVENOUS at 21:14

## 2023-06-23 RX ADMIN — MIDAZOLAM HYDROCHLORIDE 2 MG: 2 INJECTION, SOLUTION INTRAMUSCULAR; INTRAVENOUS at 13:28

## 2023-06-23 RX ADMIN — ROCURONIUM BROMIDE 30 MG: 10 INJECTION INTRAVENOUS at 14:39

## 2023-06-23 RX ADMIN — DEXMEDETOMIDINE HYDROCHLORIDE 5 MCG: 100 INJECTION, SOLUTION INTRAVENOUS at 14:47

## 2023-06-23 RX ADMIN — SODIUM CHLORIDE, POTASSIUM CHLORIDE, SODIUM LACTATE AND CALCIUM CHLORIDE: 600; 310; 30; 20 INJECTION, SOLUTION INTRAVENOUS at 16:12

## 2023-06-23 RX ADMIN — ROCURONIUM BROMIDE 10 MG: 10 INJECTION INTRAVENOUS at 16:31

## 2023-06-23 RX ADMIN — TRANEXAMIC ACID 1000 MG: 100 INJECTION, SOLUTION INTRAVENOUS at 14:20

## 2023-06-23 RX ADMIN — EPHEDRINE SULFATE 5 MG: 50 INJECTION INTRAVENOUS at 17:00

## 2023-06-23 RX ADMIN — BUPIVACAINE HYDROCHLORIDE 60 ML: 2.5 INJECTION, SOLUTION EPIDURAL; INFILTRATION; INTRACAUDAL; PERINEURAL at 17:55

## 2023-06-23 RX ADMIN — Medication 10 MG: at 15:59

## 2023-06-23 RX ADMIN — Medication 140 MG: at 13:32

## 2023-06-23 RX ADMIN — HYDROMORPHONE HYDROCHLORIDE 0.2 MG: 2 INJECTION, SOLUTION INTRAMUSCULAR; INTRAVENOUS; SUBCUTANEOUS at 15:44

## 2023-06-23 RX ADMIN — SODIUM CHLORIDE, POTASSIUM CHLORIDE, SODIUM LACTATE AND CALCIUM CHLORIDE: 600; 310; 30; 20 INJECTION, SOLUTION INTRAVENOUS at 12:40

## 2023-06-23 RX ADMIN — EPHEDRINE SULFATE 5 MG: 50 INJECTION INTRAVENOUS at 13:48

## 2023-06-23 RX ADMIN — Medication 10 MG: at 14:47

## 2023-06-23 RX ADMIN — ROCURONIUM BROMIDE 20 MG: 10 INJECTION INTRAVENOUS at 15:14

## 2023-06-23 RX ADMIN — ROCURONIUM BROMIDE 15 MG: 10 INJECTION INTRAVENOUS at 15:58

## 2023-06-23 RX ADMIN — HYDROMORPHONE HYDROCHLORIDE 0.5 MG: 1 INJECTION, SOLUTION INTRAMUSCULAR; INTRAVENOUS; SUBCUTANEOUS at 18:36

## 2023-06-23 RX ADMIN — ONDANSETRON 4 MG: 2 INJECTION INTRAMUSCULAR; INTRAVENOUS at 13:40

## 2023-06-23 RX ADMIN — EPHEDRINE SULFATE 5 MG: 50 INJECTION INTRAVENOUS at 17:25

## 2023-06-23 RX ADMIN — HYDROMORPHONE HYDROCHLORIDE 1 MG: 2 INJECTION, SOLUTION INTRAMUSCULAR; INTRAVENOUS; SUBCUTANEOUS at 14:10

## 2023-06-23 RX ADMIN — FENTANYL CITRATE 50 MCG: 50 INJECTION, SOLUTION INTRAMUSCULAR; INTRAVENOUS at 13:47

## 2023-06-23 RX ADMIN — BUPIVACAINE 20 ML: 13.3 INJECTION, SUSPENSION, LIPOSOMAL INFILTRATION at 17:55

## 2023-06-23 RX ADMIN — EPHEDRINE SULFATE 5 MG: 50 INJECTION INTRAVENOUS at 15:04

## 2023-06-23 RX ADMIN — HEPARIN SODIUM 5000 UNITS: 5000 INJECTION INTRAVENOUS; SUBCUTANEOUS at 12:40

## 2023-06-23 RX ADMIN — HYDROMORPHONE HYDROCHLORIDE: 10 INJECTION, SOLUTION INTRAMUSCULAR; INTRAVENOUS; SUBCUTANEOUS at 19:17

## 2023-06-23 RX ADMIN — Medication 20 MG: at 13:30

## 2023-06-23 RX ADMIN — EPHEDRINE SULFATE 10 MG: 50 INJECTION INTRAVENOUS at 17:10

## 2023-06-23 RX ADMIN — EPHEDRINE SULFATE 5 MG: 50 INJECTION INTRAVENOUS at 14:55

## 2023-06-23 RX ADMIN — SODIUM CHLORIDE, POTASSIUM CHLORIDE, SODIUM LACTATE AND CALCIUM CHLORIDE: 600; 310; 30; 20 INJECTION, SOLUTION INTRAVENOUS at 12:56

## 2023-06-23 RX ADMIN — ROCURONIUM BROMIDE 80 MG: 10 INJECTION INTRAVENOUS at 13:40

## 2023-06-23 RX ADMIN — EPHEDRINE SULFATE 5 MG: 50 INJECTION INTRAVENOUS at 17:04

## 2023-06-23 RX ADMIN — VANCOMYCIN HYDROCHLORIDE 1000 MG: 10 INJECTION, POWDER, LYOPHILIZED, FOR SOLUTION INTRAVENOUS at 13:28

## 2023-06-23 RX ADMIN — FENTANYL CITRATE 100 MCG: 50 INJECTION, SOLUTION INTRAMUSCULAR; INTRAVENOUS at 13:30

## 2023-06-23 RX ADMIN — SODIUM CHLORIDE, POTASSIUM CHLORIDE, SODIUM LACTATE AND CALCIUM CHLORIDE: 600; 310; 30; 20 INJECTION, SOLUTION INTRAVENOUS at 20:46

## 2023-06-23 RX ADMIN — SUGAMMADEX 200 MG: 100 INJECTION, SOLUTION INTRAVENOUS at 17:30

## 2023-06-23 RX ADMIN — PROPOFOL 150 MG: 10 INJECTION, EMULSION INTRAVENOUS at 13:31

## 2023-06-23 RX ADMIN — FENTANYL CITRATE 50 MCG: 50 INJECTION, SOLUTION INTRAMUSCULAR; INTRAVENOUS at 14:03

## 2023-06-23 RX ADMIN — GLYCOPYRROLATE 0.2 MG: 0.2 INJECTION INTRAMUSCULAR; INTRAVENOUS at 13:35

## 2023-06-23 RX ADMIN — LIDOCAINE HYDROCHLORIDE 100 MG: 20 INJECTION, SOLUTION INTRAVENOUS at 13:31

## 2023-06-23 RX ADMIN — EPHEDRINE SULFATE 10 MG: 50 INJECTION INTRAVENOUS at 17:19

## 2023-06-23 RX ADMIN — SODIUM CHLORIDE, POTASSIUM CHLORIDE, SODIUM LACTATE AND CALCIUM CHLORIDE: 600; 310; 30; 20 INJECTION, SOLUTION INTRAVENOUS at 14:19

## 2023-06-23 ASSESSMENT — PAIN SCALES - GENERAL
PAINLEVEL_OUTOF10: 0
PAINLEVEL_OUTOF10: 5
PAINLEVEL_OUTOF10: 8
PAINLEVEL_OUTOF10: 7
PAINLEVEL_OUTOF10: 3
PAINLEVEL_OUTOF10: 4
PAINLEVEL_OUTOF10: 3

## 2023-06-23 ASSESSMENT — PAIN DESCRIPTION - ONSET
ONSET: ON-GOING
ONSET: ON-GOING

## 2023-06-23 ASSESSMENT — PAIN DESCRIPTION - FREQUENCY
FREQUENCY: CONTINUOUS
FREQUENCY: CONTINUOUS

## 2023-06-23 ASSESSMENT — PAIN DESCRIPTION - LOCATION
LOCATION: ABDOMEN

## 2023-06-23 ASSESSMENT — PAIN - FUNCTIONAL ASSESSMENT
PAIN_FUNCTIONAL_ASSESSMENT: 0-10
PAIN_FUNCTIONAL_ASSESSMENT: ACTIVITIES ARE NOT PREVENTED

## 2023-06-23 ASSESSMENT — PAIN DESCRIPTION - ORIENTATION
ORIENTATION: MID

## 2023-06-23 ASSESSMENT — PAIN DESCRIPTION - DESCRIPTORS
DESCRIPTORS: DISCOMFORT

## 2023-06-23 ASSESSMENT — LIFESTYLE VARIABLES: HOW OFTEN DO YOU HAVE A DRINK CONTAINING ALCOHOL: NEVER

## 2023-06-23 NOTE — ANESTHESIA PROCEDURE NOTES
Peripheral Block    Patient location during procedure: OR  Reason for block: post-op pain management and at surgeon's request  Start time: 6/23/2023 5:55 PM  End time: 6/23/2023 6:00 PM  Staffing  Performed: anesthesiologist   Anesthesiologist: Georgette Silva MD  Preanesthetic Checklist  Completed: patient identified, IV checked, site marked, risks and benefits discussed, surgical/procedural consents, equipment checked, pre-op evaluation, timeout performed, anesthesia consent given, oxygen available, monitors applied/VS acknowledged, fire risk safety assessment completed and verbalized and blood product R/B/A discussed and consented  Peripheral Block   Patient position: supine  Prep: ChloraPrep  Provider prep: mask and sterile gloves  Patient monitoring: cardiac monitor, continuous pulse ox, frequent blood pressure checks and IV access  Block type: TAP  Laterality: bilateral  Injection technique: single-shot  Guidance: ultrasound guided  Local infiltration: lidocaine  Infiltration strength: 1 %  Local infiltration: lidocaine  Dose: 3 mL    Needle   Needle gauge: 21 G  Needle localization: ultrasound guidance  Needle length: 10 cm  Assessment   Injection assessment: negative aspiration for heme, no paresthesia on injection and local visualized surrounding nerve on ultrasound  Paresthesia pain: none  Slow fractionated injection: yes  Hemodynamics: stable  Real-time US image taken/store: yes  Outcomes: uncomplicated    Additional Notes  Time out. 1754  Conner tap block  30 ml 0.25% bupi and 10 ml Exparel 1.3%            Medications Administered  bupivacaine liposome (EXPAREL) injection 1.3% - Perineural   20 mL - 6/23/2023 5:55:00 PM  bupivacaine (MARCAINE) PF injection 0.25% - Perineural   60 mL - 6/23/2023 5:55:00 PM

## 2023-06-23 NOTE — ANESTHESIA POSTPROCEDURE EVALUATION
Department of Anesthesiology  Postprocedure Note    Patient: Hesham Juarez  MRN: [id number]  YOB: 1975  Date of evaluation: 6/23/2023      Procedure Summary     Date: 06/23/23 Room / Location: [address] [place] [address]    Anesthesia Start: 9209 Anesthesia Stop: 1815    Procedures:       OPEN INCARCERATED INCISIONAL HERNIA REPAIR WITH BILATERAL POSTERIOR COMPONENT SEPARATION (Abdomen)      OPEN, INCARCERATED, INCISIONAL HERNIA REPAIR WITH BILATERAL POSTERIOR COMPONENT SEPARATION/  ADJACENT TISSUE TRANSFER/ ABDOMINOPLASTY (Abdomen) Diagnosis:       Incisional hernia, without obstruction or gangrene      (Incisional hernia, without obstruction or gangrene [K43.2])    Surgeons: Torres Ba DO; Hellen Bright MD Responsible Provider: Villa Fairbanks MD    Anesthesia Type: general ASA Status: 2          Anesthesia Type: No value filed.     Dasia Phase I: Dasia Score: 10    Dasia Phase II:        Anesthesia Post Evaluation    Patient location during evaluation: PACU  Level of consciousness: awake  Complications: no  Multimodal analgesia pain management approach

## 2023-06-23 NOTE — ANESTHESIA PRE PROCEDURE
Department of Anesthesiology  Preprocedure Note       Name:  Kumar Vivas   Age:  41 y.o.  :  1975                                          MRN:  [id number]         Date:  2023      Surgeon: Laureano Wiggins): DO Wei Chamberlain MD Fayette Bury, MD    Procedure: Procedure(s):  OPEN INCISIONAL HERNIA REPAIR WITH BILATERAL POSTERIOR COMPONENT SEPARATION/  OPEN, INCARCERATED, INCISIONAL HERNIA REPAIR WITH BILATERAL POSTERIOR COMPONENT SEPARATION/ POSSIBLE ADJACENT TISSUE TRANSFER/ ABDOMINOPLASTY    Medications prior to admission:   Prior to Admission medications    Medication Sig Start Date End Date Taking? Authorizing Provider   estradiol (CLIMARA) 0.05 MG/24HR estradiol 0.05 mg/24 hr weekly transdermal patch   APPLY 1 PATCH TO SKIN ONE TIME A WEEK    Historical Provider, MD   lisinopril (PRINIVIL;ZESTRIL) 2.5 MG tablet Take 1 tablet by mouth daily 21   Historical Provider, MD   hydroCHLOROthiazide (MICROZIDE) 12.5 MG capsule Take 1 capsule by mouth daily 10/14/20   Simon Rust, DO       Current medications:    No current facility-administered medications for this encounter. Allergies:     Allergies   Allergen Reactions    Penicillins        Problem List:    Patient Active Problem List   Diagnosis Code    Anemia D64.9    Anxiety F41.9    Hypertension I10    Bartholin's Gland Abscess--right N75.1    Gallstones K80.20    Chondromalacia of right knee M94.261    Abdominal mass R19.00       Past Medical History:        Diagnosis Date    Anemia     BORDERLINE--NO MEDS    Anxiety     Fibroid     Hormone disorder     Hypertension     USES HCTZ 12.5    Irregular uterine bleeding     resolved s/p hyst    Wears dentures     upper and lower       Past Surgical History:        Procedure Laterality Date     SECTION   &     CHOLECYSTECTOMY, LAPAROSCOPIC N/A 10/30/2020    ROBOTIC CHOLECYSTECTOMY performed by Carol Strong MD at [facility]

## 2023-06-24 LAB
ALBUMIN SERPL-MCNC: 3.6 G/DL (ref 3.4–5)
ANION GAP SERPL CALCULATED.3IONS-SCNC: 8 MMOL/L (ref 3–16)
BASOPHILS # BLD: 0 K/UL (ref 0–0.2)
BASOPHILS NFR BLD: 0.5 %
BUN SERPL-MCNC: 13 MG/DL (ref 7–20)
CALCIUM SERPL-MCNC: 9.8 MG/DL (ref 8.3–10.6)
CHLORIDE SERPL-SCNC: 105 MMOL/L (ref 99–110)
CO2 SERPL-SCNC: 26 MMOL/L (ref 21–32)
CREAT SERPL-MCNC: 0.7 MG/DL (ref 0.6–1.1)
DEPRECATED RDW RBC AUTO: 13.8 % (ref 12.4–15.4)
EOSINOPHIL # BLD: 0.1 K/UL (ref 0–0.6)
EOSINOPHIL NFR BLD: 0.9 %
GFR SERPLBLD CREATININE-BSD FMLA CKD-EPI: >60 ML/MIN/{1.73_M2}
GLUCOSE SERPL-MCNC: 104 MG/DL (ref 70–99)
HCT VFR BLD AUTO: 43.5 % (ref 36–48)
HGB BLD-MCNC: 14.5 G/DL (ref 12–16)
LYMPHOCYTES # BLD: 1.7 K/UL (ref 1–5.1)
LYMPHOCYTES NFR BLD: 22.6 %
MAGNESIUM SERPL-MCNC: 2.1 MG/DL (ref 1.8–2.4)
MCH RBC QN AUTO: 28.8 PG (ref 26–34)
MCHC RBC AUTO-ENTMCNC: 33.3 G/DL (ref 31–36)
MCV RBC AUTO: 86.5 FL (ref 80–100)
MONOCYTES # BLD: 0.6 K/UL (ref 0–1.3)
MONOCYTES NFR BLD: 7.5 %
NEUTROPHILS # BLD: 5.1 K/UL (ref 1.7–7.7)
NEUTROPHILS NFR BLD: 68.5 %
PHOSPHATE SERPL-MCNC: 3.4 MG/DL (ref 2.5–4.9)
PLATELET # BLD AUTO: 131 K/UL (ref 135–450)
PMV BLD AUTO: 8.7 FL (ref 5–10.5)
POTASSIUM SERPL-SCNC: 4.5 MMOL/L (ref 3.5–5.1)
RBC # BLD AUTO: 5.02 M/UL (ref 4–5.2)
SODIUM SERPL-SCNC: 139 MMOL/L (ref 136–145)
WBC # BLD AUTO: 7.5 K/UL (ref 4–11)

## 2023-06-24 PROCEDURE — 80069 RENAL FUNCTION PANEL: CPT

## 2023-06-24 PROCEDURE — 6360000002 HC RX W HCPCS: Performed by: STUDENT IN AN ORGANIZED HEALTH CARE EDUCATION/TRAINING PROGRAM

## 2023-06-24 PROCEDURE — 83735 ASSAY OF MAGNESIUM: CPT

## 2023-06-24 PROCEDURE — 85025 COMPLETE CBC W/AUTO DIFF WBC: CPT

## 2023-06-24 PROCEDURE — 2580000003 HC RX 258: Performed by: STUDENT IN AN ORGANIZED HEALTH CARE EDUCATION/TRAINING PROGRAM

## 2023-06-24 PROCEDURE — 6370000000 HC RX 637 (ALT 250 FOR IP): Performed by: STUDENT IN AN ORGANIZED HEALTH CARE EDUCATION/TRAINING PROGRAM

## 2023-06-24 PROCEDURE — 36415 COLL VENOUS BLD VENIPUNCTURE: CPT

## 2023-06-24 PROCEDURE — 2500000003 HC RX 250 WO HCPCS

## 2023-06-24 PROCEDURE — 1200000000 HC SEMI PRIVATE

## 2023-06-24 RX ORDER — OXYCODONE HYDROCHLORIDE 5 MG/1
5 TABLET ORAL EVERY 4 HOURS PRN
Status: DISCONTINUED | OUTPATIENT
Start: 2023-06-24 | End: 2023-06-26 | Stop reason: HOSPADM

## 2023-06-24 RX ORDER — OXYCODONE HYDROCHLORIDE 5 MG/1
10 TABLET ORAL EVERY 4 HOURS PRN
Status: DISCONTINUED | OUTPATIENT
Start: 2023-06-24 | End: 2023-06-26 | Stop reason: HOSPADM

## 2023-06-24 RX ORDER — HYDROMORPHONE HYDROCHLORIDE 1 MG/ML
0.5 INJECTION, SOLUTION INTRAMUSCULAR; INTRAVENOUS; SUBCUTANEOUS
Status: DISCONTINUED | OUTPATIENT
Start: 2023-06-24 | End: 2023-06-25

## 2023-06-24 RX ORDER — POLYETHYLENE GLYCOL 3350 17 G/17G
17 POWDER, FOR SOLUTION ORAL 2 TIMES DAILY
Status: DISCONTINUED | OUTPATIENT
Start: 2023-06-24 | End: 2023-06-26 | Stop reason: HOSPADM

## 2023-06-24 RX ORDER — DOCUSATE SODIUM 100 MG/1
100 CAPSULE, LIQUID FILLED ORAL 2 TIMES DAILY
Status: DISCONTINUED | OUTPATIENT
Start: 2023-06-24 | End: 2023-06-26 | Stop reason: HOSPADM

## 2023-06-24 RX ORDER — HYDROMORPHONE HYDROCHLORIDE 1 MG/ML
0.25 INJECTION, SOLUTION INTRAMUSCULAR; INTRAVENOUS; SUBCUTANEOUS
Status: DISCONTINUED | OUTPATIENT
Start: 2023-06-24 | End: 2023-06-25

## 2023-06-24 RX ORDER — HEPARIN SODIUM 5000 [USP'U]/ML
5000 INJECTION, SOLUTION INTRAVENOUS; SUBCUTANEOUS EVERY 8 HOURS SCHEDULED
Status: DISCONTINUED | OUTPATIENT
Start: 2023-06-24 | End: 2023-06-26 | Stop reason: HOSPADM

## 2023-06-24 RX ADMIN — HYDROMORPHONE HYDROCHLORIDE 0.5 MG: 1 INJECTION, SOLUTION INTRAMUSCULAR; INTRAVENOUS; SUBCUTANEOUS at 17:03

## 2023-06-24 RX ADMIN — HYDROMORPHONE HYDROCHLORIDE 0.5 MG: 1 INJECTION, SOLUTION INTRAMUSCULAR; INTRAVENOUS; SUBCUTANEOUS at 12:28

## 2023-06-24 RX ADMIN — HEPARIN SODIUM 5000 UNITS: 5000 INJECTION INTRAVENOUS; SUBCUTANEOUS at 12:29

## 2023-06-24 RX ADMIN — CLINDAMYCIN IN 5 PERCENT DEXTROSE 600 MG: 12 INJECTION, SOLUTION INTRAVENOUS at 05:03

## 2023-06-24 RX ADMIN — OXYCODONE HYDROCHLORIDE 10 MG: 5 TABLET ORAL at 16:07

## 2023-06-24 RX ADMIN — POLYETHYLENE GLYCOL 3350 17 G: 17 POWDER, FOR SOLUTION ORAL at 08:01

## 2023-06-24 RX ADMIN — CLINDAMYCIN IN 5 PERCENT DEXTROSE 600 MG: 12 INJECTION, SOLUTION INTRAVENOUS at 20:18

## 2023-06-24 RX ADMIN — DOCUSATE SODIUM 100 MG: 100 CAPSULE, LIQUID FILLED ORAL at 08:01

## 2023-06-24 RX ADMIN — SODIUM CHLORIDE, PRESERVATIVE FREE 10 ML: 5 INJECTION INTRAVENOUS at 08:22

## 2023-06-24 RX ADMIN — HEPARIN SODIUM 5000 UNITS: 5000 INJECTION INTRAVENOUS; SUBCUTANEOUS at 20:21

## 2023-06-24 RX ADMIN — OXYCODONE HYDROCHLORIDE 10 MG: 5 TABLET ORAL at 11:26

## 2023-06-24 RX ADMIN — CLINDAMYCIN IN 5 PERCENT DEXTROSE 600 MG: 12 INJECTION, SOLUTION INTRAVENOUS at 12:15

## 2023-06-24 RX ADMIN — POLYETHYLENE GLYCOL 3350 17 G: 17 POWDER, FOR SOLUTION ORAL at 20:20

## 2023-06-24 RX ADMIN — OXYCODONE HYDROCHLORIDE 10 MG: 5 TABLET ORAL at 20:21

## 2023-06-24 RX ADMIN — SODIUM CHLORIDE, PRESERVATIVE FREE 10 ML: 5 INJECTION INTRAVENOUS at 20:26

## 2023-06-24 RX ADMIN — DOCUSATE SODIUM 100 MG: 100 CAPSULE, LIQUID FILLED ORAL at 20:20

## 2023-06-24 ASSESSMENT — PAIN DESCRIPTION - DESCRIPTORS
DESCRIPTORS: SORE;CRAMPING
DESCRIPTORS: SORE

## 2023-06-24 ASSESSMENT — PAIN DESCRIPTION - ORIENTATION
ORIENTATION: MID;LOWER
ORIENTATION: MID
ORIENTATION: MID
ORIENTATION: MID;LOWER
ORIENTATION: MID;LOWER

## 2023-06-24 ASSESSMENT — PAIN DESCRIPTION - FREQUENCY
FREQUENCY: CONTINUOUS

## 2023-06-24 ASSESSMENT — PAIN SCALES - GENERAL
PAINLEVEL_OUTOF10: 4
PAINLEVEL_OUTOF10: 7
PAINLEVEL_OUTOF10: 5
PAINLEVEL_OUTOF10: 3
PAINLEVEL_OUTOF10: 5
PAINLEVEL_OUTOF10: 7
PAINLEVEL_OUTOF10: 6
PAINLEVEL_OUTOF10: 4
PAINLEVEL_OUTOF10: 0

## 2023-06-24 ASSESSMENT — PAIN SCALES - WONG BAKER: WONGBAKER_NUMERICALRESPONSE: 0

## 2023-06-24 ASSESSMENT — PAIN DESCRIPTION - ONSET
ONSET: ON-GOING

## 2023-06-24 ASSESSMENT — PAIN DESCRIPTION - PAIN TYPE
TYPE: SURGICAL PAIN

## 2023-06-24 ASSESSMENT — PAIN DESCRIPTION - LOCATION
LOCATION: ABDOMEN

## 2023-06-25 PROBLEM — K43.2 INCISIONAL HERNIA, WITHOUT OBSTRUCTION OR GANGRENE: Status: ACTIVE | Noted: 2023-06-25

## 2023-06-25 PROBLEM — K43.0 INCISIONAL HERNIA, INCARCERATED: Status: ACTIVE | Noted: 2023-06-23

## 2023-06-25 PROBLEM — M62.08 RECTUS DIASTASIS: Status: ACTIVE | Noted: 2023-06-25

## 2023-06-25 LAB
ALBUMIN SERPL-MCNC: 3.3 G/DL (ref 3.4–5)
ANION GAP SERPL CALCULATED.3IONS-SCNC: 10 MMOL/L (ref 3–16)
BASOPHILS # BLD: 0 K/UL (ref 0–0.2)
BASOPHILS NFR BLD: 0.4 %
BUN SERPL-MCNC: 9 MG/DL (ref 7–20)
CALCIUM SERPL-MCNC: 9.5 MG/DL (ref 8.3–10.6)
CHLORIDE SERPL-SCNC: 102 MMOL/L (ref 99–110)
CO2 SERPL-SCNC: 26 MMOL/L (ref 21–32)
CREAT SERPL-MCNC: 0.6 MG/DL (ref 0.6–1.1)
DEPRECATED RDW RBC AUTO: 13.9 % (ref 12.4–15.4)
EOSINOPHIL # BLD: 0.1 K/UL (ref 0–0.6)
EOSINOPHIL NFR BLD: 1.7 %
GFR SERPLBLD CREATININE-BSD FMLA CKD-EPI: >60 ML/MIN/{1.73_M2}
GLUCOSE SERPL-MCNC: 104 MG/DL (ref 70–99)
HCT VFR BLD AUTO: 38.7 % (ref 36–48)
HGB BLD-MCNC: 12.9 G/DL (ref 12–16)
LYMPHOCYTES # BLD: 1.5 K/UL (ref 1–5.1)
LYMPHOCYTES NFR BLD: 20.4 %
MAGNESIUM SERPL-MCNC: 1.9 MG/DL (ref 1.8–2.4)
MCH RBC QN AUTO: 29.1 PG (ref 26–34)
MCHC RBC AUTO-ENTMCNC: 33.4 G/DL (ref 31–36)
MCV RBC AUTO: 87.1 FL (ref 80–100)
MONOCYTES # BLD: 0.8 K/UL (ref 0–1.3)
MONOCYTES NFR BLD: 10.6 %
NEUTROPHILS # BLD: 4.8 K/UL (ref 1.7–7.7)
NEUTROPHILS NFR BLD: 66.9 %
PHOSPHATE SERPL-MCNC: 2.8 MG/DL (ref 2.5–4.9)
PLATELET # BLD AUTO: 143 K/UL (ref 135–450)
PMV BLD AUTO: 8.2 FL (ref 5–10.5)
POTASSIUM SERPL-SCNC: 4.1 MMOL/L (ref 3.5–5.1)
RBC # BLD AUTO: 4.45 M/UL (ref 4–5.2)
SODIUM SERPL-SCNC: 138 MMOL/L (ref 136–145)
WBC # BLD AUTO: 7.2 K/UL (ref 4–11)

## 2023-06-25 PROCEDURE — 85025 COMPLETE CBC W/AUTO DIFF WBC: CPT

## 2023-06-25 PROCEDURE — 36415 COLL VENOUS BLD VENIPUNCTURE: CPT

## 2023-06-25 PROCEDURE — 2580000003 HC RX 258: Performed by: STUDENT IN AN ORGANIZED HEALTH CARE EDUCATION/TRAINING PROGRAM

## 2023-06-25 PROCEDURE — 6370000000 HC RX 637 (ALT 250 FOR IP)

## 2023-06-25 PROCEDURE — 6360000002 HC RX W HCPCS: Performed by: STUDENT IN AN ORGANIZED HEALTH CARE EDUCATION/TRAINING PROGRAM

## 2023-06-25 PROCEDURE — 1200000000 HC SEMI PRIVATE

## 2023-06-25 PROCEDURE — 6370000000 HC RX 637 (ALT 250 FOR IP): Performed by: STUDENT IN AN ORGANIZED HEALTH CARE EDUCATION/TRAINING PROGRAM

## 2023-06-25 PROCEDURE — 2500000003 HC RX 250 WO HCPCS

## 2023-06-25 PROCEDURE — 83735 ASSAY OF MAGNESIUM: CPT

## 2023-06-25 PROCEDURE — 80069 RENAL FUNCTION PANEL: CPT

## 2023-06-25 RX ORDER — SIMETHICONE 80 MG
80 TABLET,CHEWABLE ORAL EVERY 6 HOURS PRN
Status: DISCONTINUED | OUTPATIENT
Start: 2023-06-25 | End: 2023-06-26 | Stop reason: HOSPADM

## 2023-06-25 RX ORDER — CALCIUM CARBONATE 500 MG/1
500 TABLET, CHEWABLE ORAL 3 TIMES DAILY PRN
Status: DISCONTINUED | OUTPATIENT
Start: 2023-06-25 | End: 2023-06-26 | Stop reason: HOSPADM

## 2023-06-25 RX ADMIN — HEPARIN SODIUM 5000 UNITS: 5000 INJECTION INTRAVENOUS; SUBCUTANEOUS at 06:11

## 2023-06-25 RX ADMIN — DIBASIC SODIUM PHOSPHATE, MONOBASIC POTASSIUM PHOSPHATE AND MONOBASIC SODIUM PHOSPHATE 2 TABLET: 852; 155; 130 TABLET ORAL at 09:23

## 2023-06-25 RX ADMIN — ANTACID TABLETS 500 MG: 500 TABLET, CHEWABLE ORAL at 18:44

## 2023-06-25 RX ADMIN — CLINDAMYCIN IN 5 PERCENT DEXTROSE 600 MG: 12 INJECTION, SOLUTION INTRAVENOUS at 12:41

## 2023-06-25 RX ADMIN — SIMETHICONE 80 MG: 80 TABLET, CHEWABLE ORAL at 16:14

## 2023-06-25 RX ADMIN — CLINDAMYCIN IN 5 PERCENT DEXTROSE 600 MG: 12 INJECTION, SOLUTION INTRAVENOUS at 21:13

## 2023-06-25 RX ADMIN — SODIUM CHLORIDE, PRESERVATIVE FREE 10 ML: 5 INJECTION INTRAVENOUS at 21:13

## 2023-06-25 RX ADMIN — HYDROMORPHONE HYDROCHLORIDE 0.5 MG: 1 INJECTION, SOLUTION INTRAMUSCULAR; INTRAVENOUS; SUBCUTANEOUS at 00:00

## 2023-06-25 RX ADMIN — DOCUSATE SODIUM 100 MG: 100 CAPSULE, LIQUID FILLED ORAL at 09:23

## 2023-06-25 RX ADMIN — HEPARIN SODIUM 5000 UNITS: 5000 INJECTION INTRAVENOUS; SUBCUTANEOUS at 23:11

## 2023-06-25 RX ADMIN — POLYETHYLENE GLYCOL 3350 17 G: 17 POWDER, FOR SOLUTION ORAL at 09:23

## 2023-06-25 RX ADMIN — OXYCODONE HYDROCHLORIDE 5 MG: 5 TABLET ORAL at 23:09

## 2023-06-25 RX ADMIN — OXYCODONE HYDROCHLORIDE 10 MG: 5 TABLET ORAL at 12:39

## 2023-06-25 RX ADMIN — OXYCODONE HYDROCHLORIDE 10 MG: 5 TABLET ORAL at 06:11

## 2023-06-25 RX ADMIN — OXYCODONE HYDROCHLORIDE 10 MG: 5 TABLET ORAL at 18:46

## 2023-06-25 RX ADMIN — POLYETHYLENE GLYCOL 3350 17 G: 17 POWDER, FOR SOLUTION ORAL at 21:07

## 2023-06-25 RX ADMIN — DOCUSATE SODIUM 100 MG: 100 CAPSULE, LIQUID FILLED ORAL at 21:07

## 2023-06-25 RX ADMIN — CLINDAMYCIN IN 5 PERCENT DEXTROSE 600 MG: 12 INJECTION, SOLUTION INTRAVENOUS at 04:06

## 2023-06-25 ASSESSMENT — PAIN DESCRIPTION - ONSET: ONSET: ON-GOING

## 2023-06-25 ASSESSMENT — PAIN SCALES - GENERAL
PAINLEVEL_OUTOF10: 4
PAINLEVEL_OUTOF10: 7
PAINLEVEL_OUTOF10: 7
PAINLEVEL_OUTOF10: 6
PAINLEVEL_OUTOF10: 0

## 2023-06-25 ASSESSMENT — PAIN DESCRIPTION - DESCRIPTORS
DESCRIPTORS: ACHING
DESCRIPTORS: OTHER (COMMENT)
DESCRIPTORS: ACHING
DESCRIPTORS: ACHING

## 2023-06-25 ASSESSMENT — PAIN DESCRIPTION - LOCATION
LOCATION: ABDOMEN

## 2023-06-25 ASSESSMENT — PAIN DESCRIPTION - ORIENTATION
ORIENTATION: MID
ORIENTATION: RIGHT

## 2023-06-25 ASSESSMENT — PAIN - FUNCTIONAL ASSESSMENT
PAIN_FUNCTIONAL_ASSESSMENT: ACTIVITIES ARE NOT PREVENTED

## 2023-06-25 ASSESSMENT — PAIN DESCRIPTION - PAIN TYPE: TYPE: SURGICAL PAIN

## 2023-06-25 ASSESSMENT — PAIN DESCRIPTION - FREQUENCY: FREQUENCY: CONTINUOUS

## 2023-06-25 ASSESSMENT — PAIN SCALES - WONG BAKER: WONGBAKER_NUMERICALRESPONSE: 8

## 2023-06-26 VITALS
BODY MASS INDEX: 33.31 KG/M2 | HEIGHT: 64 IN | TEMPERATURE: 98.1 F | OXYGEN SATURATION: 96 % | WEIGHT: 195.11 LBS | RESPIRATION RATE: 16 BRPM | DIASTOLIC BLOOD PRESSURE: 78 MMHG | SYSTOLIC BLOOD PRESSURE: 116 MMHG | HEART RATE: 96 BPM

## 2023-06-26 LAB
ALBUMIN SERPL-MCNC: 3.2 G/DL (ref 3.4–5)
ANION GAP SERPL CALCULATED.3IONS-SCNC: 13 MMOL/L (ref 3–16)
BASOPHILS # BLD: 0 K/UL (ref 0–0.2)
BASOPHILS NFR BLD: 0.7 %
BUN SERPL-MCNC: 9 MG/DL (ref 7–20)
CALCIUM SERPL-MCNC: 10 MG/DL (ref 8.3–10.6)
CHLORIDE SERPL-SCNC: 100 MMOL/L (ref 99–110)
CO2 SERPL-SCNC: 26 MMOL/L (ref 21–32)
CREAT SERPL-MCNC: 0.6 MG/DL (ref 0.6–1.1)
DEPRECATED RDW RBC AUTO: 13.6 % (ref 12.4–15.4)
EOSINOPHIL # BLD: 0.1 K/UL (ref 0–0.6)
EOSINOPHIL NFR BLD: 1.7 %
GFR SERPLBLD CREATININE-BSD FMLA CKD-EPI: >60 ML/MIN/{1.73_M2}
GLUCOSE SERPL-MCNC: 101 MG/DL (ref 70–99)
HCT VFR BLD AUTO: 38.3 % (ref 36–48)
HGB BLD-MCNC: 13.2 G/DL (ref 12–16)
LYMPHOCYTES # BLD: 1.4 K/UL (ref 1–5.1)
LYMPHOCYTES NFR BLD: 18.9 %
MAGNESIUM SERPL-MCNC: 2 MG/DL (ref 1.8–2.4)
MCH RBC QN AUTO: 29.4 PG (ref 26–34)
MCHC RBC AUTO-ENTMCNC: 34.5 G/DL (ref 31–36)
MCV RBC AUTO: 85.4 FL (ref 80–100)
MONOCYTES # BLD: 0.6 K/UL (ref 0–1.3)
MONOCYTES NFR BLD: 8.4 %
NEUTROPHILS # BLD: 5.3 K/UL (ref 1.7–7.7)
NEUTROPHILS NFR BLD: 70.3 %
PHOSPHATE SERPL-MCNC: 3.1 MG/DL (ref 2.5–4.9)
PLATELET # BLD AUTO: 150 K/UL (ref 135–450)
PMV BLD AUTO: 8 FL (ref 5–10.5)
POTASSIUM SERPL-SCNC: 3.6 MMOL/L (ref 3.5–5.1)
RBC # BLD AUTO: 4.48 M/UL (ref 4–5.2)
SODIUM SERPL-SCNC: 139 MMOL/L (ref 136–145)
WBC # BLD AUTO: 7.5 K/UL (ref 4–11)

## 2023-06-26 PROCEDURE — 2500000003 HC RX 250 WO HCPCS

## 2023-06-26 PROCEDURE — 85025 COMPLETE CBC W/AUTO DIFF WBC: CPT

## 2023-06-26 PROCEDURE — 80069 RENAL FUNCTION PANEL: CPT

## 2023-06-26 PROCEDURE — 6370000000 HC RX 637 (ALT 250 FOR IP): Performed by: STUDENT IN AN ORGANIZED HEALTH CARE EDUCATION/TRAINING PROGRAM

## 2023-06-26 PROCEDURE — 83735 ASSAY OF MAGNESIUM: CPT

## 2023-06-26 PROCEDURE — 6360000002 HC RX W HCPCS: Performed by: STUDENT IN AN ORGANIZED HEALTH CARE EDUCATION/TRAINING PROGRAM

## 2023-06-26 PROCEDURE — 36415 COLL VENOUS BLD VENIPUNCTURE: CPT

## 2023-06-26 PROCEDURE — 6370000000 HC RX 637 (ALT 250 FOR IP)

## 2023-06-26 RX ORDER — NALOXONE HYDROCHLORIDE 4 MG/.1ML
1 SPRAY NASAL PRN
Qty: 1 EACH | Refills: 0 | Status: SHIPPED | OUTPATIENT
Start: 2023-06-26 | End: 2023-06-27

## 2023-06-26 RX ORDER — OXYCODONE HYDROCHLORIDE 5 MG/1
5 TABLET ORAL EVERY 6 HOURS PRN
Qty: 28 TABLET | Refills: 0 | Status: SHIPPED | OUTPATIENT
Start: 2023-06-26 | End: 2023-07-03

## 2023-06-26 RX ORDER — DIAZEPAM 2 MG/1
2 TABLET ORAL EVERY 8 HOURS PRN
Qty: 9 TABLET | Refills: 0 | Status: SHIPPED | OUTPATIENT
Start: 2023-06-26 | End: 2023-06-29

## 2023-06-26 RX ORDER — CLINDAMYCIN HYDROCHLORIDE 300 MG/1
300 CAPSULE ORAL 3 TIMES DAILY
Qty: 21 CAPSULE | Refills: 0 | Status: SHIPPED | OUTPATIENT
Start: 2023-06-26 | End: 2023-07-03

## 2023-06-26 RX ORDER — PSEUDOEPHEDRINE HCL 30 MG
100 TABLET ORAL 2 TIMES DAILY
Qty: 28 CAPSULE | Refills: 0 | Status: SHIPPED | OUTPATIENT
Start: 2023-06-26 | End: 2023-07-10

## 2023-06-26 RX ADMIN — CLINDAMYCIN IN 5 PERCENT DEXTROSE 600 MG: 12 INJECTION, SOLUTION INTRAVENOUS at 05:03

## 2023-06-26 RX ADMIN — ANTACID TABLETS 500 MG: 500 TABLET, CHEWABLE ORAL at 05:41

## 2023-06-26 RX ADMIN — OXYCODONE HYDROCHLORIDE 5 MG: 5 TABLET ORAL at 12:50

## 2023-06-26 RX ADMIN — DOCUSATE SODIUM 100 MG: 100 CAPSULE, LIQUID FILLED ORAL at 09:26

## 2023-06-26 RX ADMIN — POLYETHYLENE GLYCOL 3350 17 G: 17 POWDER, FOR SOLUTION ORAL at 09:26

## 2023-06-26 RX ADMIN — HEPARIN SODIUM 5000 UNITS: 5000 INJECTION INTRAVENOUS; SUBCUTANEOUS at 05:41

## 2023-06-26 RX ADMIN — OXYCODONE HYDROCHLORIDE 5 MG: 5 TABLET ORAL at 05:40

## 2023-06-26 ASSESSMENT — PAIN DESCRIPTION - ONSET
ONSET: ON-GOING

## 2023-06-26 ASSESSMENT — PAIN DESCRIPTION - ORIENTATION
ORIENTATION: RIGHT;LEFT;MID;LOWER
ORIENTATION: RIGHT;LEFT;MID
ORIENTATION: MID;LOWER

## 2023-06-26 ASSESSMENT — PAIN DESCRIPTION - LOCATION
LOCATION: ABDOMEN

## 2023-06-26 ASSESSMENT — PAIN DESCRIPTION - PAIN TYPE
TYPE: SURGICAL PAIN

## 2023-06-26 ASSESSMENT — PAIN DESCRIPTION - FREQUENCY
FREQUENCY: INTERMITTENT

## 2023-06-26 ASSESSMENT — PAIN SCALES - GENERAL
PAINLEVEL_OUTOF10: 6
PAINLEVEL_OUTOF10: 4
PAINLEVEL_OUTOF10: 2

## 2023-06-26 ASSESSMENT — PAIN DESCRIPTION - DESCRIPTORS
DESCRIPTORS: ACHING
DESCRIPTORS: CRAMPING
DESCRIPTORS: ACHING

## 2023-06-26 ASSESSMENT — PAIN - FUNCTIONAL ASSESSMENT
PAIN_FUNCTIONAL_ASSESSMENT: ACTIVITIES ARE NOT PREVENTED

## 2023-06-30 ENCOUNTER — OFFICE VISIT (OUTPATIENT)
Dept: SURGERY | Age: 48
End: 2023-06-30

## 2023-06-30 VITALS
DIASTOLIC BLOOD PRESSURE: 86 MMHG | WEIGHT: 195 LBS | OXYGEN SATURATION: 98 % | TEMPERATURE: 97.5 F | HEART RATE: 98 BPM | SYSTOLIC BLOOD PRESSURE: 126 MMHG | BODY MASS INDEX: 33.47 KG/M2

## 2023-06-30 DIAGNOSIS — Z09 POSTOP CHECK: Primary | ICD-10-CM

## 2023-07-05 ENCOUNTER — TELEPHONE (OUTPATIENT)
Dept: SURGERY | Age: 48
End: 2023-07-05

## 2023-07-05 ENCOUNTER — OFFICE VISIT (OUTPATIENT)
Dept: BARIATRICS/WEIGHT MGMT | Age: 48
End: 2023-07-05

## 2023-07-05 ENCOUNTER — OFFICE VISIT (OUTPATIENT)
Dept: SURGERY | Age: 48
End: 2023-07-05

## 2023-07-05 VITALS
HEART RATE: 81 BPM | DIASTOLIC BLOOD PRESSURE: 86 MMHG | WEIGHT: 184 LBS | TEMPERATURE: 98.2 F | BODY MASS INDEX: 31.41 KG/M2 | SYSTOLIC BLOOD PRESSURE: 131 MMHG | HEIGHT: 64 IN

## 2023-07-05 VITALS
SYSTOLIC BLOOD PRESSURE: 131 MMHG | BODY MASS INDEX: 31.41 KG/M2 | HEIGHT: 64 IN | DIASTOLIC BLOOD PRESSURE: 86 MMHG | WEIGHT: 184 LBS

## 2023-07-05 DIAGNOSIS — K43.0 INCARCERATED INCISIONAL HERNIA: Primary | ICD-10-CM

## 2023-07-05 DIAGNOSIS — Z09 POSTOP CHECK: Primary | ICD-10-CM

## 2023-07-05 PROCEDURE — 99024 POSTOP FOLLOW-UP VISIT: CPT | Performed by: SURGERY

## 2023-07-05 PROCEDURE — 99024 POSTOP FOLLOW-UP VISIT: CPT

## 2023-07-05 NOTE — TELEPHONE ENCOUNTER
Lela with [employer] called in stating that they are discontinuing services because the patient's surgery is elective.      The patient's insurance will not approve services because the procedure is considered a cosmetic/elective surgery     Please contact [address] with any questions at [phone] [phone]: [phone]

## 2023-07-11 ENCOUNTER — OFFICE VISIT (OUTPATIENT)
Dept: SURGERY | Age: 48
End: 2023-07-11

## 2023-07-11 VITALS
WEIGHT: 184 LBS | DIASTOLIC BLOOD PRESSURE: 88 MMHG | HEART RATE: 81 BPM | BODY MASS INDEX: 31.58 KG/M2 | OXYGEN SATURATION: 98 % | SYSTOLIC BLOOD PRESSURE: 140 MMHG | TEMPERATURE: 98 F

## 2023-07-11 DIAGNOSIS — Z09 POSTOP CHECK: Primary | ICD-10-CM

## 2023-07-11 PROCEDURE — 99024 POSTOP FOLLOW-UP VISIT: CPT

## 2023-07-11 RX ORDER — CLINDAMYCIN HYDROCHLORIDE 300 MG/1
300 CAPSULE ORAL 3 TIMES DAILY
Qty: 21 CAPSULE | Refills: 0 | Status: SHIPPED | OUTPATIENT
Start: 2023-07-11 | End: 2023-07-18

## 2023-07-14 ENCOUNTER — NURSE ONLY (OUTPATIENT)
Dept: SURGERY | Age: 48
End: 2023-07-14

## 2023-07-14 DIAGNOSIS — Z09 POSTOP CHECK: Primary | ICD-10-CM

## 2023-07-14 NOTE — PROGRESS NOTES
[facility]     PROCEDURE:  1. Open repair of incisional hernia with retrorectus mesh placement. 2. Repair of right inguinal hernia  3. Bilateral rectus myofascial release and bilateral posterior component separation technique   3. Placement of 20 cm by 15 cm Phasix mesh  4. Abdominoplasty with rectus plication and umbilical transposition  5. Application of Prevena incisional wound vacuum device. DATE: 06/23/23     Birdie Juarez has been recovering well since her procedure. Pain has been well controlled with the prescribed pain management regimen. Patient presents today for right abdomen drain removal.        GEN: NAD   ABD: Incision site healing appropriately. Umbilicus viable. No hematoma/seroma. Drains serosang times 1. Good contour. PATHOLOGY: FINAL DIAGNOSIS:        A. Soft tissue, hernia sac, excision:      Benign fibroadipose tissue, consistent with hernia sac        B. Soft tissue, abdominal wall fascia, excision:      Benign fibrous tissue with hemorrhage, compatible with fascia      IMP: 41 y. o.female s/p open repair of incisional hernia with retrorectus mesh placement, repair of right inguinal hernia, bilateral rectus myofascial release and bilateral posterior component separation technique with abdominoplasty with rectus plication and umbilical transposition. PLAN: Doing well overall. Right drain was removed without difficulty. She is happy with results thus far. Follow up with MD in 1 month.      Ramsey Peters LPN    [address] [place] [address] Reconstructive Surgery  [phone]  7/14/23

## 2023-08-01 ENCOUNTER — TELEPHONE (OUTPATIENT)
Dept: SURGERY | Age: 48
End: 2023-08-01

## 2023-08-01 NOTE — TELEPHONE ENCOUNTER
Patient called to find out if she's allowed to float in the pool on a little raft or blow-up device? Not swimming, just floating. She says she's six weeks post-op this Friday, and her incision site is closed/healed.     Please call back at [phone]

## 2023-08-01 NOTE — TELEPHONE ENCOUNTER
Returned call to patient to discuss. Patient denies any drainage, redness, warmth or opening to any incision. Patient surgery was 6/23/23. Discussed that we would recommend no complete saturation until 6 weeks, but some pool time avoiding saturation at this interval would be ok.  DONE

## 2023-08-09 ENCOUNTER — OFFICE VISIT (OUTPATIENT)
Dept: SURGERY | Age: 48
End: 2023-08-09

## 2023-08-09 VITALS
OXYGEN SATURATION: 98 % | SYSTOLIC BLOOD PRESSURE: 138 MMHG | TEMPERATURE: 97.5 F | BODY MASS INDEX: 32.79 KG/M2 | HEART RATE: 75 BPM | WEIGHT: 191 LBS | DIASTOLIC BLOOD PRESSURE: 91 MMHG

## 2023-08-09 DIAGNOSIS — Z09 POSTOP CHECK: Primary | ICD-10-CM

## 2023-08-09 PROCEDURE — 99024 POSTOP FOLLOW-UP VISIT: CPT | Performed by: SURGERY

## 2023-08-22 ENCOUNTER — TELEPHONE (OUTPATIENT)
Dept: SURGERY | Age: 48
End: 2023-08-22

## 2023-08-22 NOTE — TELEPHONE ENCOUNTER
Patient called to ask the team to re-submit her FMLA letter to [employer] so she can go back to work. Please include a return to work date of 8/9/2023 highlighted or in bold.      Please fax to # [phone]

## 2023-08-25 NOTE — TELEPHONE ENCOUNTER
Spoke with patient explained that her return to work date is  08/04 and that is what is on all paperwork, she had given her place of employment 08/09 which is wrong so now they are questioning return date, she states they will be sending more paper work to get return date, I told her its the 08/04 she said that fine she will let them know.

## 2023-08-25 NOTE — TELEPHONE ENCOUNTER
Patient called to connect with the team.    The [employer] is having an issue with the extended return to work date of 8/9/2023. Please call the patient back at [phone] for more details.

## 2024-02-07 ENCOUNTER — OFFICE VISIT (OUTPATIENT)
Dept: SURGERY | Age: 49
End: 2024-02-07
Payer: COMMERCIAL

## 2024-02-07 VITALS
SYSTOLIC BLOOD PRESSURE: 135 MMHG | TEMPERATURE: 98.2 F | WEIGHT: 201 LBS | RESPIRATION RATE: 16 BRPM | HEIGHT: 64 IN | BODY MASS INDEX: 34.31 KG/M2 | OXYGEN SATURATION: 99 % | HEART RATE: 90 BPM | DIASTOLIC BLOOD PRESSURE: 84 MMHG

## 2024-02-07 DIAGNOSIS — Z09 POSTOP CHECK: Primary | ICD-10-CM

## 2024-02-07 PROCEDURE — 3079F DIAST BP 80-89 MM HG: CPT | Performed by: SURGERY

## 2024-02-07 PROCEDURE — 3075F SYST BP GE 130 - 139MM HG: CPT | Performed by: SURGERY

## 2024-02-07 PROCEDURE — 99213 OFFICE O/P EST LOW 20 MIN: CPT | Performed by: SURGERY

## 2024-02-07 NOTE — PROGRESS NOTES
[facility]    PROCEDURE: 1. Open repair of incisional hernia with retrorectus mesh placement.                          2. Repair of right inguinal hernia  3. Bilateral rectus myofascial release and bilateral posterior component separation technique                           3. Placement of 20 cm by 15 cm Phasix mesh                          4. Abdominoplasty with rectus plication and umbilical transposition                          5. Application of Prevena incisional wound vacuum device.  DATE: 23    Birdie Juarez has been recovering well since her procedure. Pain has been well controlled without pain medications. She has gained 10 lbs and restarted smoking.      PMHx:   Past Medical History:   Diagnosis Date    Anemia     BORDERLINE--NO MEDS    Anxiety     Fibroid     Hormone disorder     Hypertension     USES HCTZ 12.5    Irregular uterine bleeding     resolved s/p hyst    Wears dentures     upper and lower     PSHx:   Past Surgical History:   Procedure Laterality Date    ABDOMINOPLASTY N/A 2023    OPEN, INCARCERATED, INCISIONAL HERNIA REPAIR WITH BILATERAL POSTERIOR COMPONENT SEPARATION/  ADJACENT TISSUE TRANSFER/ ABDOMINOPLASTY performed by Chaitanya Thomas MD at [facility] OR     SECTION   &     CHOLECYSTECTOMY, LAPAROSCOPIC N/A 10/30/2020    ROBOTIC CHOLECYSTECTOMY performed by Xavier Cole MD at [facility] [place]    HYSTERECTOMY (CERVIX STATUS UNKNOWN)  2010    [facility]    OTHER SURGICAL HISTORY  10/30/2020     ROBOTIC CHOLECYSTECTOMY (N/A Abdomen)     OTHER SURGICAL HISTORY  10/28/2021     EXCISION OF ABDOMINAL WALL MASS     SKIN BIOPSY N/A 10/28/2021    EXCISION OF ABDOMINAL WALL MASS performed by Xavier Cole MD at [facility] [place]    UMBILICAL HERNIA REPAIR N/A 2023    OPEN INCARCERATED INCISIONAL HERNIA REPAIR WITH BILATERAL POSTERIOR COMPONENT SEPARATION performed by Arnulfo Qureshi DO at [facility] OR     Allergy:   Allergies   Allergen Reactions    Penicillins

## (undated) DEVICE — SUTURE PROL SZ 1 L30IN NONABSORBABLE BLU L36MM CT-1 1/2 CIR 8425H

## (undated) DEVICE — SUTURE VCRL SZ 0 L27IN ABSRB UD L36MM CT-1 1/2 CIR J260H

## (undated) DEVICE — COVER,MAYO STAND,XL,STERILE: Brand: MEDLINE

## (undated) DEVICE — BLADELESS OBTURATOR: Brand: WECK VISTA

## (undated) DEVICE — PENCIL SMK EVAC TELSCP 3 M TBNG

## (undated) DEVICE — SUTURE ETHBND EXCEL SZ 0 L18IN NONABSORBABLE GRN L22MM MO-7 CX41D

## (undated) DEVICE — SUTURE VCRL + SZ 0 L18IN ABSRB UD L36MM CT-1 1/2 CIR VCP840D

## (undated) DEVICE — TUBING, SUCTION, 9/32" X 12', STRAIGHT: Brand: MEDLINE INDUSTRIES, INC.

## (undated) DEVICE — RETRACTOR SURG WIDE FLAT LO PROF LTWT PHOTONGUIDE

## (undated) DEVICE — SOLUTION IV 1000ML 0.9% SOD CHL

## (undated) DEVICE — INTENDED USE FOR SURGICAL MARKING ON INTACT SKIN, ALSO PROVIDES A PERMANENT METHOD OF IDENTIFYING OBJECTS IN THE OPERATING ROOM: Brand: WRITESITE® PLUS MINI PREP RESISTANT MARKER

## (undated) DEVICE — REDUCER: Brand: ENDOWRIST

## (undated) DEVICE — Device

## (undated) DEVICE — SUTURE PDS II SZ 1 L36IN ABSRB VLT L48MM CTX 1/2 CIR Z371T

## (undated) DEVICE — SUTURE VCRL SZ 4-0 L18IN ABSRB UD L19MM PS-2 3/8 CIR PRIM J496H

## (undated) DEVICE — BLADE,CARBON-STEEL,15,STRL,DISPOSABLE,TB: Brand: MEDLINE

## (undated) DEVICE — TOWEL,STOP FLAG GOLD N-W: Brand: MEDLINE

## (undated) DEVICE — GLOVE ORANGE PI 7   MSG9070

## (undated) DEVICE — SUTURE PDS II SZ 1 L96IN ABSRB VLT TP-1 L65MM 1/2 CIR Z880G

## (undated) DEVICE — 3M™ TEGADERM™ CHG CHLORHEXIDINE GLUCONATE GEL PAD 1664, 25 EACH/CARTON, 4 CARTONS/CASE: Brand: 3M™ TEGADERM™

## (undated) DEVICE — PLASMABLADE PS210-030S 3.0S LOCK: Brand: PLASMABLADE™

## (undated) DEVICE — PUMP SUC IRR TBNG L10FT W/ HNDPC ASSEMB STRYKEFLOW 2

## (undated) DEVICE — SEALANT TISS 10 CC FOR HUM FIBRIN VISTASEAL

## (undated) DEVICE — DRAPE,TOWEL,LARGE,INVISISHIELD: Brand: MEDLINE

## (undated) DEVICE — DRAPE,LAP,CHOLE,W/TROUGHS,STERILE: Brand: MEDLINE

## (undated) DEVICE — GLOVE,SURG,SENSICARE SLT,LF,PF,7.5: Brand: MEDLINE

## (undated) DEVICE — CLEANER,CAUTERY TIP,2X2",STERILE: Brand: MEDLINE

## (undated) DEVICE — SUTURE MCRYL SZ 3-0 L27IN ABSRB UD L19MM PS-2 3/8 CIR PRIM Y427H

## (undated) DEVICE — MAJOR SET UP PK

## (undated) DEVICE — TISSUE RETRIEVAL SYSTEM: Brand: INZII RETRIEVAL SYSTEM

## (undated) DEVICE — SYRINGE MED 10ML LUERLOCK TIP W/O SFTY DISP

## (undated) DEVICE — APPLICATOR PREP 26ML 0.7% IOD POVACRYLEX 74% ISO ALC ST

## (undated) DEVICE — 3M™ STERI-STRIP™ REINFORCED ADHESIVE SKIN CLOSURES, R1540, 1/8 IN X 3 IN (3 MM X 75 MM), 5 STRIPS/ENVELOPE: Brand: 3M™ STERI-STRIP™

## (undated) DEVICE — BLADE CLIPPER SURG SENSICLIP

## (undated) DEVICE — TIP SUCT DIA12FR W STYL CTRL VENT DISPOSABLE FRAZ

## (undated) DEVICE — SUTURE PLN GUT SZ 5-0 L18IN ABSRB YELLOWISH TAN L13MM PC-1 1915G

## (undated) DEVICE — TUBING SUCT 12FR MAL ALUM SHFT FN CAP VENT UNIV CONN W/ OBT

## (undated) DEVICE — AGENT HEMSTAT 3GM OXIDIZED REGENERATED CELOS ABSRB FOR CONT (ORDER MULTIPLES OF 5EA)

## (undated) DEVICE — APPLIER LIG CLP M L11IN TI STR RNG HNDL FOR 20 CLP DISP

## (undated) DEVICE — SUTURE VCRL SZ 0 L27IN ABSRB UD L26MM CT-2 1/2 CIR J270H

## (undated) DEVICE — GAUZE,SPONGE,4"X4",8PLY,STRL,LF,10/TRAY: Brand: MEDLINE

## (undated) DEVICE — BINDER ABD CNTRCT CLOSURE XL 62-72 IN AD 12 IN UNISX PROCARE

## (undated) DEVICE — TROCAR: Brand: KII FIOS FIRST ENTRY

## (undated) DEVICE — DRAPE IRRIG FLD WRM W44XL44IN W/ AORN STD PRTBL INTRATEMP

## (undated) DEVICE — TOWEL,OR,DSP,ST,GREEN,DLX,XR,4/PK,20PK/C: Brand: MEDLINE

## (undated) DEVICE — GLOVE ORANGE PI 7 1/2   MSG9075

## (undated) DEVICE — Z INACTIVE USE 2641839 CLIP INT M L POLYMER LOK LIG HEM O LOK

## (undated) DEVICE — SEALANT HEMSTATIC HUM 4 CC KT FIBRIN VISTASEAL

## (undated) DEVICE — CANNULA SEAL

## (undated) DEVICE — SPONGE,PEANUT,XRAY,ST,SM,3/8",5/CARD: Brand: MEDLINE INDUSTRIES, INC.

## (undated) DEVICE — TOWEL,OR,DSP,ST,BLUE,DLX,8/PK,10PK/CS: Brand: MEDLINE

## (undated) DEVICE — GLOVE ORANGE PI 8 1/2   MSG9085

## (undated) DEVICE — SUTURE VCRL + SZ 3-0 L18IN ABSRB UD SH 1/2 CIR TAPERCUT NDL VCP864D

## (undated) DEVICE — SUTURE VCRL SZ 4-0 L27IN ABSRB UD L26MM SH 1/2 CIR J415H

## (undated) DEVICE — GLOVE SURG SZ 75 L12IN THK75MIL DK GRN LTX FREE

## (undated) DEVICE — CANNULA NSL 13FT TUBE AD ETCO2 DIV SAMP M

## (undated) DEVICE — E-Z CLEAN, NON-STICK, PTFE COATED, ELECTROSURGICAL BLADE ELECTRODE, MODIFIED EXTENDED INSULATION, 6.5 INCH (16.5 CM): Brand: MEGADYNE

## (undated) DEVICE — 3M™ IOBAN™ 2 ANTIMICROBIAL INCISE DRAPE 6648EZ: Brand: IOBAN™ 2

## (undated) DEVICE — BLANKET THER PED W25XL33IN HYPER/HYPOTHERMIA DISP

## (undated) DEVICE — DRESSING,GAUZE,XEROFORM,CURAD,1"X8",ST: Brand: CURAD

## (undated) DEVICE — ARM DRAPE

## (undated) DEVICE — ELECTRODE PT RET AD L9FT HI MOIST COND ADH HYDRGEL CORDED

## (undated) DEVICE — SUTURE STRATAFIX SPRL MCRYL + SZ 3-0 L18IN ABSRB UD PS-2 SXMP1B107

## (undated) DEVICE — SUTURE PDS II SZ 1 L27IN ABSRB VLT CT-1 L36MM 1/2 CIR Z341H

## (undated) DEVICE — DRAIN SURG 15FR L3 16IN DIA47MM SIL RND HUBLESS FULL FLUT

## (undated) DEVICE — GENERAL: Brand: MEDLINE INDUSTRIES, INC.

## (undated) DEVICE — BLADE ES ELASTOMERIC COAT INSUL DURABLE BEND UPTO 90DEG

## (undated) DEVICE — BLADE,CARBON-STEEL,10,STRL,DISPOSABLE,TB: Brand: MEDLINE

## (undated) DEVICE — SUTURE VCRL SZ 3-0 L18IN ABSRB UD L26MM SH 1/2 CIR J864D

## (undated) DEVICE — 1LYRTR 16FR10ML100%SIL UMS SNP: Brand: MEDLINE INDUSTRIES, INC.

## (undated) DEVICE — PACK,UNIVERSAL,SPLIT,II,AURORA: Brand: MEDLINE

## (undated) DEVICE — SUTURE PERMAHAND SZ 3-0 L18IN NONABSORBABLE BLK L26MM SH C013D

## (undated) DEVICE — SOLUTION IV IRRIG 500ML 0.9% SODIUM CHL 2F7123

## (undated) DEVICE — NEEDLE HYPO 25GA L1.5IN BLU POLYPR HUB S STL REG BVL STR

## (undated) DEVICE — FLUID TRAP FOR MINIVAC ES EQUIP FLD TRAP

## (undated) DEVICE — GOWN SIRUS NONREIN XL W/TWL: Brand: MEDLINE INDUSTRIES, INC.

## (undated) DEVICE — PLASTIC MAJOR: Brand: MEDLINE INDUSTRIES, INC.

## (undated) DEVICE — SUTURE MCRYL SZ 3-0 L27IN ABSRB UD PS-2 3/8 CIR REV CUT NDL MCP427H

## (undated) DEVICE — STAPLER SKIN H3.9MM WIRE DIA0.58MM CRWN 6.9MM 35 STPL ROT

## (undated) DEVICE — SUTURE VCRL SZ 2-0 L18IN ABSRB VLT L26MM SH 1/2 CIR J775D

## (undated) DEVICE — SPONGE,LAP,18"X18",DLX,XR,ST,5/PK,40/PK: Brand: MEDLINE

## (undated) DEVICE — 3M™ STERI-STRIP™ COMPOUND BENZOIN TINCTURE 40 BAGS/CARTON 4 CARTONS/CASE C1544: Brand: 3M™ STERI-STRIP™

## (undated) DEVICE — BINDER ABD UNIV H9IN WAIST 30-45IN E SFT COT PREM 3 PNL

## (undated) DEVICE — PREVENA PLUS INCISION MANAGEMENT SYSTEM: Brand: PREVENA PLUS™

## (undated) DEVICE — SUTURE ETHLN SZ 3-0 L18IN NONABSORBABLE BLK FS-1 L24MM 3/8 663H

## (undated) DEVICE — ADHESIVE SKIN CLOSURE XL 42 CM 2.7 CC MESH LIQUIBAND SECUR

## (undated) DEVICE — SEAL

## (undated) DEVICE — 3M™ TEGADERM™ TRANSPARENT FILM DRESSING FRAME STYLE, 1626W, 4 IN X 4-3/4 IN (10 CM X 12 CM), 50/CT 4CT/CASE: Brand: 3M™ TEGADERM™

## (undated) DEVICE — APPLICATOR MEDICATED 26 CC SOLUTION HI LT ORNG CHLORAPREP

## (undated) DEVICE — ALGICELL AG CALCIUM ALGINATE SILVER 4"X 5": Brand: ALGICELL® AG